# Patient Record
Sex: FEMALE | Race: WHITE | Employment: UNEMPLOYED | URBAN - METROPOLITAN AREA
[De-identification: names, ages, dates, MRNs, and addresses within clinical notes are randomized per-mention and may not be internally consistent; named-entity substitution may affect disease eponyms.]

---

## 2017-01-11 ENCOUNTER — OFFICE VISIT (OUTPATIENT)
Dept: FAMILY MEDICINE CLINIC | Facility: CLINIC | Age: 22
End: 2017-01-11

## 2017-01-11 VITALS
TEMPERATURE: 99 F | BODY MASS INDEX: 23.3 KG/M2 | WEIGHT: 138.19 LBS | DIASTOLIC BLOOD PRESSURE: 62 MMHG | SYSTOLIC BLOOD PRESSURE: 100 MMHG | HEIGHT: 64.5 IN | RESPIRATION RATE: 16 BRPM | HEART RATE: 78 BPM

## 2017-01-11 DIAGNOSIS — Z30.40 ENCOUNTER FOR SURVEILLANCE OF CONTRACEPTIVES: ICD-10-CM

## 2017-01-11 DIAGNOSIS — M54.81 BILATERAL OCCIPITAL NEURALGIA: Primary | ICD-10-CM

## 2017-01-11 DIAGNOSIS — N94.6 DYSMENORRHEA: ICD-10-CM

## 2017-01-11 PROCEDURE — 99213 OFFICE O/P EST LOW 20 MIN: CPT | Performed by: FAMILY MEDICINE

## 2017-01-11 RX ORDER — LEVONORGESTREL AND ETHINYL ESTRADIOL 0.1-0.02MG
1 KIT ORAL DAILY
Qty: 3 PACKAGE | Refills: 3 | Status: SHIPPED | OUTPATIENT
Start: 2017-01-11 | End: 2017-12-10

## 2017-01-11 NOTE — PATIENT INSTRUCTIONS
Kaila perez were seen for a referral to Dr. Matti Torres and also BCP refill. See you in two weeks for CPE and pap.  Take care, Dr. Robert Mancilla

## 2017-01-20 PROBLEM — M54.2 CERVICALGIA: Status: ACTIVE | Noted: 2017-01-20

## 2017-01-27 ENCOUNTER — OFFICE VISIT (OUTPATIENT)
Dept: FAMILY MEDICINE CLINIC | Facility: CLINIC | Age: 22
End: 2017-01-27

## 2017-01-27 VITALS
HEART RATE: 88 BPM | DIASTOLIC BLOOD PRESSURE: 70 MMHG | RESPIRATION RATE: 16 BRPM | TEMPERATURE: 99 F | WEIGHT: 138 LBS | HEIGHT: 64.5 IN | BODY MASS INDEX: 23.27 KG/M2 | SYSTOLIC BLOOD PRESSURE: 118 MMHG

## 2017-01-27 DIAGNOSIS — Z71.82 EXERCISE COUNSELING: ICD-10-CM

## 2017-01-27 DIAGNOSIS — Z12.39 SCREENING BREAST EXAMINATION: ICD-10-CM

## 2017-01-27 DIAGNOSIS — Z53.29 PAP SMEAR OF CERVIX DECLINED BECAUSE OF OTHER REASONS: ICD-10-CM

## 2017-01-27 DIAGNOSIS — Z71.3 DIETARY COUNSELING: ICD-10-CM

## 2017-01-27 DIAGNOSIS — Z11.3 SCREEN FOR STD (SEXUALLY TRANSMITTED DISEASE): ICD-10-CM

## 2017-01-27 DIAGNOSIS — Z00.00 ROUTINE GENERAL MEDICAL EXAMINATION AT A HEALTH CARE FACILITY: Primary | ICD-10-CM

## 2017-01-27 PROBLEM — M54.2 CERVICALGIA: Chronic | Status: ACTIVE | Noted: 2017-01-20

## 2017-01-27 LAB
APPEARANCE: CLEAR
BILIRUBIN: NEGATIVE
KETONES (URINE DIPSTICK): 1.02 MG/DL
LEUKOCYTES: NEGATIVE
MULTISTIX LOT#: NORMAL NUMERIC
NITRITE, URINE: NEGATIVE
OCCULT BLOOD: 5.5
PH, URINE: NEGATIVE (ref 4.5–8)
PROTEIN (URINE DIPSTICK): 0.2 MG/DL
URINE-COLOR: YELLOW
UROBILINOGEN,SEMI-QN: 0.2 MG/DL (ref 0–1.9)

## 2017-01-27 PROCEDURE — 87591 N.GONORRHOEAE DNA AMP PROB: CPT | Performed by: FAMILY MEDICINE

## 2017-01-27 PROCEDURE — 87491 CHLMYD TRACH DNA AMP PROBE: CPT | Performed by: FAMILY MEDICINE

## 2017-01-27 PROCEDURE — G0438 PPPS, INITIAL VISIT: HCPCS | Performed by: FAMILY MEDICINE

## 2017-01-27 PROCEDURE — 99395 PREV VISIT EST AGE 18-39: CPT | Performed by: FAMILY MEDICINE

## 2017-01-27 NOTE — PATIENT INSTRUCTIONS
Dylan Medrano you were seen today for your annual breast exam and physical. Please return for a pap in a few months. Please continue healthy habits with your diet and exercise. Wear sunscreen as needed and insect repellant when needed.   See a dentist and eye d Syphilis Women at increased risk for infection – talk with your healthcare provider At routine exams   Tuberculosis Women at increased risk for infection – talk with your healthcare provider Ask your healthcare provider   Vision All women in this age group Tetanus/diphtheria/pertussis (Td/Tdap) booster All women in this age group Td every 10 years, or a one-time dose of Tdap instead of a Td booster after age 25, then Td every 10 years   Counseling Who needs it How often   BRCA gene mutation testing for breas

## 2017-01-27 NOTE — PROGRESS NOTES
HPI:   Alayna Villalba is a 20year old female who presents for a complete physical exam and screening breast exam.  She is not going to do pap as on menses. Pt feeling well today.  Dentist and eye doctors--seen dentist and due for eye exam;  Diet --desc breast exam; cannot do pap due to menses; See details of HPI.   SKIN: denies any unusual skin lesions  EYES:denies blurred vision or double vision  HEENT: denies nasal congestion, sinus pain or ST  LUNGS: denies shortness of breath with exertion  CARDIOVASC AND PLAN:   Deyanira Christian presents for her annual exam and screening normal breast exam.  Self breast exams and regular skin exams advised. She will return for her pap in a few months when off menses.  She agreed to urine screen for GC/ CHL as she has pa

## 2017-01-29 LAB
C TRACH DNA SPEC QL NAA+PROBE: NEGATIVE
N GONORRHOEA DNA SPEC QL NAA+PROBE: NEGATIVE

## 2017-01-29 NOTE — PROGRESS NOTES
Quick Note:    Please call pt with negative STD screening results for GC/CHL and repeat as directed annually--- Dr. Hwang   ______

## 2017-03-30 ENCOUNTER — OFFICE VISIT (OUTPATIENT)
Dept: FAMILY MEDICINE CLINIC | Facility: CLINIC | Age: 22
End: 2017-03-30

## 2017-03-30 VITALS
SYSTOLIC BLOOD PRESSURE: 114 MMHG | TEMPERATURE: 98 F | BODY MASS INDEX: 23.78 KG/M2 | HEART RATE: 77 BPM | WEIGHT: 141 LBS | RESPIRATION RATE: 16 BRPM | DIASTOLIC BLOOD PRESSURE: 76 MMHG | HEIGHT: 64.5 IN

## 2017-03-30 DIAGNOSIS — Z01.419 NORMAL PELVIC EXAM: ICD-10-CM

## 2017-03-30 DIAGNOSIS — Z01.419 PAP SMEAR, LOW-RISK: Primary | ICD-10-CM

## 2017-03-30 DIAGNOSIS — Z12.4 ROUTINE CERVICAL SMEAR: ICD-10-CM

## 2017-03-30 PROCEDURE — 87624 HPV HI-RISK TYP POOLED RSLT: CPT | Performed by: FAMILY MEDICINE

## 2017-03-30 PROCEDURE — 99214 OFFICE O/P EST MOD 30 MIN: CPT | Performed by: FAMILY MEDICINE

## 2017-03-30 PROCEDURE — 88175 CYTOPATH C/V AUTO FLUID REDO: CPT | Performed by: FAMILY MEDICINE

## 2017-03-30 NOTE — PATIENT INSTRUCTIONS
Paige Urbina you were seen for a pap today as we could not do one in January. Keep up annual paps and healthy habits. Take care, Dr. Lexis Henning    Why Have a Pap Test?  Early on, cervical changes don't cause symptoms.  Often, the only way to know you have · A first Pap test at age 24.  And then every 3 years until age 34, HPV testing is not recommended during this time, though it may be done to follow-up on an abnormal Pap test.  · Starting at age 27, the preferred testing is a Pap test done with an HPV test When your Pap test is sent to the lab, the lab studies your cell samples and reports any abnormal cell changes. Your health care provider can discuss these changes with you. In some cases, an abnormal Pap test is due to an infection.  More serious cell chase · Mild dysplasia: Cells show distinct changes. More testing or HPV typing may be done. You may also have treatment to destroy or remove problem cells. (Reported as low-grade JUSTINE or PADILLA 1.)  · Moderate to severe dysplasia: Cells show precancerous changes.  O

## 2017-03-30 NOTE — PROGRESS NOTES
HPI:   Justin Sung is a 20year old female here for pap only. She had a comprehensive physical on 1/27/17. Monthly menses are fine and normal for her --no mood issues or pain; SA --not currently. PAP--had only one in past was ok; No other issues.  Lef no depression or anxiety     EXAM:   /76 mmHg  Pulse 77  Temp(Src) 98 °F (36.7 °C) (Temporal)  Resp 16  Ht 64.5\"  Wt 141 lb  BMI 23.84 kg/m2  LMP 03/23/2017  Body mass index is 23.84 kg/(m^2).    GENERAL: well developed, well nourished, in no apparen Hpv Dna  High Risk , Thin Prep Collect;  Future  Dr. Rayray Mane

## 2017-03-31 LAB
LAST PAP RESULT: NORMAL
PAP HISTORY (OTHER THAN LAST PAP): NORMAL

## 2017-09-14 ENCOUNTER — TELEPHONE (OUTPATIENT)
Dept: FAMILY MEDICINE CLINIC | Facility: CLINIC | Age: 22
End: 2017-09-14

## 2017-09-14 DIAGNOSIS — M54.81 BILATERAL OCCIPITAL NEURALGIA: Primary | Chronic | ICD-10-CM

## 2017-10-05 ENCOUNTER — OFFICE VISIT (OUTPATIENT)
Dept: FAMILY MEDICINE CLINIC | Facility: CLINIC | Age: 22
End: 2017-10-05

## 2017-10-05 DIAGNOSIS — Z23 NEED FOR VACCINATION: ICD-10-CM

## 2017-10-05 PROCEDURE — 90686 IIV4 VACC NO PRSV 0.5 ML IM: CPT | Performed by: FAMILY MEDICINE

## 2017-10-05 PROCEDURE — 90471 IMMUNIZATION ADMIN: CPT | Performed by: FAMILY MEDICINE

## 2017-10-13 NOTE — PROGRESS NOTES
Pt presents to clinic for Flu vaccine, consent obtained and reviewed.  Flu shot administered without complication

## 2017-12-10 DIAGNOSIS — N94.6 DYSMENORRHEA: ICD-10-CM

## 2017-12-10 DIAGNOSIS — Z30.40 ENCOUNTER FOR SURVEILLANCE OF CONTRACEPTIVES: ICD-10-CM

## 2017-12-11 RX ORDER — LEVONORGESTREL AND ETHINYL ESTRADIOL 0.1-0.02MG
1 KIT ORAL DAILY
Qty: 84 TABLET | Refills: 0 | Status: SHIPPED | OUTPATIENT
Start: 2017-12-11 | End: 2018-02-23

## 2017-12-11 NOTE — TELEPHONE ENCOUNTER
Requested Medications   LUTERA TABLETS 28S  Will file in chart as: LUTERA 0.1-20 MG-MCG Oral Tab  Take 1 tablet by mouth daily.   Original sig: TAKE 1 TABLET BY MOUTH DAILY       Disp: 84 tablet Refills: 0    Class: Normal Start: 12/10/2017   For: [employer]

## 2018-02-23 ENCOUNTER — OFFICE VISIT (OUTPATIENT)
Dept: FAMILY MEDICINE CLINIC | Facility: CLINIC | Age: 23
End: 2018-02-23

## 2018-02-23 VITALS
HEIGHT: 64.5 IN | SYSTOLIC BLOOD PRESSURE: 112 MMHG | HEART RATE: 78 BPM | DIASTOLIC BLOOD PRESSURE: 80 MMHG | RESPIRATION RATE: 18 BRPM | WEIGHT: 152.38 LBS | TEMPERATURE: 98 F | BODY MASS INDEX: 25.7 KG/M2 | OXYGEN SATURATION: 98 %

## 2018-02-23 DIAGNOSIS — Z11.8 SCREENING FOR CHLAMYDIAL DISEASE: Primary | ICD-10-CM

## 2018-02-23 DIAGNOSIS — Z30.41 ENCOUNTER FOR SURVEILLANCE OF CONTRACEPTIVE PILLS: ICD-10-CM

## 2018-02-23 DIAGNOSIS — M54.81 BILATERAL OCCIPITAL NEURALGIA: Chronic | ICD-10-CM

## 2018-02-23 DIAGNOSIS — Z23 NEED FOR VACCINATION: ICD-10-CM

## 2018-02-23 DIAGNOSIS — N94.6 DYSMENORRHEA: ICD-10-CM

## 2018-02-23 DIAGNOSIS — M54.2 CERVICALGIA: Chronic | ICD-10-CM

## 2018-02-23 LAB — CONTROL LINE PRESENT WITH A CLEAR BACKGROUND (YES/NO): YES YES/NO

## 2018-02-23 PROCEDURE — 87591 N.GONORRHOEAE DNA AMP PROB: CPT | Performed by: FAMILY MEDICINE

## 2018-02-23 PROCEDURE — 90471 IMMUNIZATION ADMIN: CPT | Performed by: FAMILY MEDICINE

## 2018-02-23 PROCEDURE — 99213 OFFICE O/P EST LOW 20 MIN: CPT | Performed by: FAMILY MEDICINE

## 2018-02-23 PROCEDURE — 87491 CHLMYD TRACH DNA AMP PROBE: CPT | Performed by: FAMILY MEDICINE

## 2018-02-23 PROCEDURE — 90715 TDAP VACCINE 7 YRS/> IM: CPT | Performed by: FAMILY MEDICINE

## 2018-02-23 PROCEDURE — 81025 URINE PREGNANCY TEST: CPT | Performed by: FAMILY MEDICINE

## 2018-02-23 RX ORDER — LEVONORGESTREL AND ETHINYL ESTRADIOL 0.1-0.02MG
1 KIT ORAL DAILY
Qty: 84 TABLET | Refills: 0 | Status: SHIPPED | OUTPATIENT
Start: 2018-02-23 | End: 2018-05-18

## 2018-02-23 NOTE — PATIENT INSTRUCTIONS
-Condoms are recommended in all  and unmarried couples due to help spread HIV infection and other sexually transmitted infections per CDC guidelines. · Oral birth control pills can cause blood clots in the body.   · Signs of blood clots in the legs · May not work as well when taken with certain other medicines (check with your pharmacist)  · May cause side effects such as nausea, irregular bleeding, headaches, breast tenderness, fatigue, or mood changes (these often go away within 3 months)  · May in If the infection is not treated, it can lead to more serious health problems. In women, this can be pelvic inflammatory disease (PID). PID can make a woman sterile. It can also cause an ectopic (tubal) pregnancy.  This type of pregnancy cannot be carried to

## 2018-02-23 NOTE — PROGRESS NOTES
CHIEF COMPLAINT: Patient presents with:  Establish Care: BC refill; referral neurology      HPI:     Katelynn Asher is a 24year old female presents for establish care birth control refill and referral for neurology. OCP-×3-4 years.   Currently not sex Smokeless tobacco: Never Used                      Alcohol use:  No                 Medications (Active prior to today's visit):    Current Outpatient Prescriptions:  Levonorgestrel-Ethinyl Estrad (LUTERA) 0.1-20 MG-MCG Oral Tab Take 1 tablet by mouth d results within 2 Month(s) from this visit.    Latest known visit with results is:   Office Visit on 03/30/2017   Component Date Value   • Thin Prep Pap 04/04/2017 AP TEST REFLEXED    • Case Report 03/31/2017                      Value:Gynecologic Cytology Value:This result contains rich text formatting which cannot be displayed here.    • HPV High Risk 04/02/2017 Negative    • HPV Source 04/02/2017 Cervical/endocervical/vaginal       REVIEWED THIS VISIT  ASSESSMENT/PLAN:   24year old female with    1 DO      Patient understands plan and follow-up.   Return in about 2 months (around 4/23/2018) for annual physical, fasting labs AM.

## 2018-02-26 LAB
C TRACH DNA SPEC QL NAA+PROBE: NEGATIVE
N GONORRHOEA DNA SPEC QL NAA+PROBE: NEGATIVE

## 2018-02-28 ENCOUNTER — TELEPHONE (OUTPATIENT)
Dept: FAMILY MEDICINE CLINIC | Facility: CLINIC | Age: 23
End: 2018-02-28

## 2018-02-28 NOTE — TELEPHONE ENCOUNTER
LMOM to return call to the office. Provided pt office phone [phone] along with office hours given. Notes Recorded by Orly Galvan DO on 2/26/2018 at 9:24 PM CST  Results reviewed. Tests show no significant abnormalities. Please inform patient.

## 2018-04-25 ENCOUNTER — OFFICE VISIT (OUTPATIENT)
Dept: FAMILY MEDICINE CLINIC | Facility: CLINIC | Age: 23
End: 2018-04-25

## 2018-04-25 VITALS
WEIGHT: 144 LBS | TEMPERATURE: 97 F | RESPIRATION RATE: 18 BRPM | HEART RATE: 89 BPM | DIASTOLIC BLOOD PRESSURE: 80 MMHG | BODY MASS INDEX: 24 KG/M2 | OXYGEN SATURATION: 98 % | SYSTOLIC BLOOD PRESSURE: 100 MMHG

## 2018-04-25 DIAGNOSIS — R12 HEARTBURN: Primary | ICD-10-CM

## 2018-04-25 DIAGNOSIS — K29.00 OTHER ACUTE GASTRITIS WITHOUT HEMORRHAGE: ICD-10-CM

## 2018-04-25 PROCEDURE — 99213 OFFICE O/P EST LOW 20 MIN: CPT | Performed by: FAMILY MEDICINE

## 2018-04-25 RX ORDER — SENNOSIDES 8.6 MG
650 CAPSULE ORAL EVERY 8 HOURS PRN
Qty: 100 TABLET | Refills: 0 | Status: SHIPPED | OUTPATIENT
Start: 2018-04-25

## 2018-04-25 RX ORDER — OMEPRAZOLE 40 MG/1
CAPSULE, DELAYED RELEASE ORAL
Qty: 90 CAPSULE | Refills: 0 | Status: SHIPPED | OUTPATIENT
Start: 2018-04-25 | End: 2018-07-30

## 2018-04-25 NOTE — PATIENT INSTRUCTIONS
Treating Gastritis     Take your medicines as directed, even if your stomach pain goes away. A medical evaluation will be done to find out the cause of your symptoms. The evaluation may include your health history, a physical exam, and some tests.  Onc Understanding Gastritis    Gastritis is a painful inflammation of the stomach lining. It has a number of causes. Gastritis and its symptoms can be relieved with treatment. Work with your healthcare provider to find ways to treat your symptoms.   The Stomach Every medicine has a different purpose. So, each one needs to be taken as prescribed. Don’t skip pills or stop taking a medicine, even when you feel fine. To stay on track try to:  · Take your medicine at set times.  You could take it each morning with jose rafael · Give a copy of your medicine list to a family member or close friend. Hold copies of each other’s lists in case of emergency. · Store medicines in a cool, dry, dark place – not in a steamy bathroom.   · Make sure you tell your healthcare providers if you Date Last Reviewed: 7/1/2016  © 3928-9286 [employer] [address]. [address] [facility] [place], [address] [place]. All rights reserved. This information is not intended as a substitute for professional medical care.  Always follow your healthcare professional'

## 2018-04-25 NOTE — PROGRESS NOTES
CHIEF COMPLAINT: Patient presents with:  Heartburn: since February; afternoon    HPI:     La Gutierrez is a 24year old female presents for evaluation of heartburn that started in February.   Has burning in her stomach at times it is nonradiating and bu (Active prior to today's visit):    Current Outpatient Prescriptions:  Omeprazole 40 MG Oral Capsule Delayed Release Take AC breakfast 45mins Disp: 90 capsule Rfl: 0   Acetaminophen ER (TYLENOL 8 HOUR ARTHRITIS PAIN) 650 MG Oral Tab CR Take 1 tablet (650 m rashes  Neuro: AOx3. Normal gait    LABS     No visits with results within 2 Month(s) from this visit.    Latest known visit with results is:   Office Visit on 02/23/2018   Component Date Value   • Pregnancy Test, Urine 02/23/2018 neg    • Control Line Pre or worsening or changing symptoms. Patient is to call with any side effects or complications from the treatments as a result of today.      Problem List:   Patient Active Problem List:     Bilateral occipital neuralgia     Dysmenorrhea     Cervicalgia

## 2018-04-27 ENCOUNTER — APPOINTMENT (OUTPATIENT)
Dept: LAB | Facility: HOSPITAL | Age: 23
End: 2018-04-27
Attending: FAMILY MEDICINE
Payer: COMMERCIAL

## 2018-04-27 DIAGNOSIS — R12 HEARTBURN: ICD-10-CM

## 2018-04-27 DIAGNOSIS — K29.00 OTHER ACUTE GASTRITIS WITHOUT HEMORRHAGE: ICD-10-CM

## 2018-04-27 PROCEDURE — 87338 HPYLORI STOOL AG IA: CPT

## 2018-05-02 ENCOUNTER — TELEPHONE (OUTPATIENT)
Dept: FAMILY MEDICINE CLINIC | Facility: CLINIC | Age: 23
End: 2018-05-02

## 2018-05-02 NOTE — TELEPHONE ENCOUNTER
SPoke with pt, reviewed results, pt verbalized understanding    Notes recorded by Orly Galvan DO on 4/30/2018 at 8:24 AM CDT  Results reviewed. Tests show no significant abnormalities.  Please inform patient

## 2018-05-18 ENCOUNTER — OFFICE VISIT (OUTPATIENT)
Dept: FAMILY MEDICINE CLINIC | Facility: CLINIC | Age: 23
End: 2018-05-18

## 2018-05-18 VITALS
SYSTOLIC BLOOD PRESSURE: 98 MMHG | BODY MASS INDEX: 24.62 KG/M2 | HEART RATE: 83 BPM | DIASTOLIC BLOOD PRESSURE: 72 MMHG | WEIGHT: 146 LBS | OXYGEN SATURATION: 98 % | RESPIRATION RATE: 18 BRPM | HEIGHT: 64.5 IN | TEMPERATURE: 99 F

## 2018-05-18 DIAGNOSIS — Z13.21 SCREENING FOR ENDOCRINE, NUTRITIONAL, METABOLIC AND IMMUNITY DISORDER: ICD-10-CM

## 2018-05-18 DIAGNOSIS — Z13.0 SCREENING FOR ENDOCRINE, NUTRITIONAL, METABOLIC AND IMMUNITY DISORDER: ICD-10-CM

## 2018-05-18 DIAGNOSIS — N94.6 DYSMENORRHEA: ICD-10-CM

## 2018-05-18 DIAGNOSIS — Z30.41 ENCOUNTER FOR SURVEILLANCE OF CONTRACEPTIVE PILLS: ICD-10-CM

## 2018-05-18 DIAGNOSIS — Z13.0 SCREENING FOR IRON DEFICIENCY ANEMIA: ICD-10-CM

## 2018-05-18 DIAGNOSIS — Z00.00 ANNUAL PHYSICAL EXAM: Primary | ICD-10-CM

## 2018-05-18 DIAGNOSIS — Z13.228 SCREENING FOR ENDOCRINE, NUTRITIONAL, METABOLIC AND IMMUNITY DISORDER: ICD-10-CM

## 2018-05-18 DIAGNOSIS — Z13.29 SCREENING FOR ENDOCRINE, NUTRITIONAL, METABOLIC AND IMMUNITY DISORDER: ICD-10-CM

## 2018-05-18 DIAGNOSIS — Z13.6 SCREENING FOR HEART DISEASE: ICD-10-CM

## 2018-05-18 PROCEDURE — 99395 PREV VISIT EST AGE 18-39: CPT | Performed by: FAMILY MEDICINE

## 2018-05-18 PROCEDURE — 80061 LIPID PANEL: CPT | Performed by: FAMILY MEDICINE

## 2018-05-18 PROCEDURE — 36415 COLL VENOUS BLD VENIPUNCTURE: CPT | Performed by: FAMILY MEDICINE

## 2018-05-18 PROCEDURE — 85025 COMPLETE CBC W/AUTO DIFF WBC: CPT | Performed by: FAMILY MEDICINE

## 2018-05-18 PROCEDURE — 80053 COMPREHEN METABOLIC PANEL: CPT | Performed by: FAMILY MEDICINE

## 2018-05-18 PROCEDURE — 84443 ASSAY THYROID STIM HORMONE: CPT | Performed by: FAMILY MEDICINE

## 2018-05-18 RX ORDER — LEVONORGESTREL AND ETHINYL ESTRADIOL 0.1-0.02MG
1 KIT ORAL DAILY
Qty: 90 TABLET | Refills: 3 | Status: SHIPPED | OUTPATIENT
Start: 2018-05-18 | End: 2019-05-05

## 2018-05-18 NOTE — PATIENT INSTRUCTIONS
Perform labs fasting 8 hours with water or black coffee or or black tea diet  soda only prior to exam.    -Encourage healthy diet of whole food and avoid processed food and sugary drinks and sodas.   Diet should include lean meats and vegetables including 5 Vitamin D comes in several forms. When ultraviolet light, such as sunlight, hits your skin, it creates vitamin D3. D2 is used to fortify dairy foods. Both of these are further processed by your liver and kidneys into a form your body can use.  Most tests fo Children and adults need more than 30 nanograms per milliliter (ng/ml) of vitamin D. The optimal level of 25(OH)D is usually between 30 and 60 ng/mL. Recommended daily amounts range from 400 to 800 international units (IU) per day based on your age.   Level Your body needs calcium to build and repair bones. But it can't make calcium on its own. That's why it's important to eat calcium-rich foods. Some foods are naturally rich in calcium. Others have calcium added (fortified).  It's best to get calcium from the Osteoporosis is a disease that weakens the bones. Weakened bones are more likely to fracture (break). Osteoporosis affects men and women, but postmenopausal women are most at risk.  To help prevent osteoporosis, you need to exercise and nourish your bones t ©2007 [employer] [address] [facility], [address] [place]. All Rights reserved. This information is not intended as a substitute for professional medical care. Always follow your healthcare provider’s instructions.

## 2018-05-18 NOTE — PROGRESS NOTES
Patient came in for fasting labs. Patient first draw attempt at left [facility] unsuccessful. Second attempt at right  [facility]. Lt green, lav tube drawn.

## 2018-05-18 NOTE — PROGRESS NOTES
REASON FOR VISIT:    Garland Katz is a 24year old female who presents for an [address]. Single not active currently.  On ocp dysmenorrhea  G0  menses: regular q month 4-5 days- on menses today-denies dysmenorrhea on OCP  Last pap: 3 No results found for: ALT  No results found for: TSH  No results found for: BUN, [address]     How would you describe your current health state?: Fair    Type of Diet: Balanced    How do you maintain positive mental well-being?: Social I Cholesterol Screening Recommended screening varies with age, risk and gender No results found for: LDL, LDLC    Diabetes Screening  if history of high blood pressure or other  risk factors No results found for: A1C  No results found for: GLUCOSE, GLU Acetaminophen ER (TYLENOL 8 HOUR ARTHRITIS PAIN) 650 MG Oral Tab CR Take 1 tablet (650 mg total) by mouth every 8 (eight) hours as needed for Pain.  Max dose: 6 tablets or less than 4000mg daily Disp: 100 tablet Rfl: 0   Levonorgestrel-Ethinyl Estrad (Elisa Fu ALL/ASTHMA: denies hx of allergy or asthma    EXAM:   BP 98/72 (BP Location: Right arm, Patient Position: Sitting, Cuff Size: adult)   Pulse 83   Temp 99.2 °F (37.3 °C) (Oral)   Resp 18   Ht 64.5\"   Wt 146 lb   LMP 05/16/2018 (Exact Date)   SpO2 98%   BMI -Needs 1000 mg of calcium daily for osteoporosis prevention discussed  -thin prep pap recommended every 3 years due 3/30/2020-annual pelvics recommended  - CBC WITH DIFFERENTIAL WITH PLATELET; Future  - COMP METABOLIC PANEL (14);  Future  - LIPID PANEL; Fut Varicella 2 doses if not immune   MMR 1-2 doses if born after 1957 and not immune

## 2018-05-23 ENCOUNTER — TELEPHONE (OUTPATIENT)
Dept: FAMILY MEDICINE CLINIC | Facility: CLINIC | Age: 23
End: 2018-05-23

## 2018-05-23 NOTE — TELEPHONE ENCOUNTER
LMOM to return call to the office. Provided pt office phone [phone] along with office hours given. Notes recorded by George Summers DO on 5/20/2018 at 10:04 PM CDT  TSH lower end of normal range.  Repeat TSH in 6 mos  CBC and cmp normal except fo

## 2018-05-24 NOTE — TELEPHONE ENCOUNTER
Patient called in reference to her results said she could not see them on my chart and if your could put them on there.

## 2018-07-30 ENCOUNTER — OFFICE VISIT (OUTPATIENT)
Dept: FAMILY MEDICINE CLINIC | Facility: CLINIC | Age: 23
End: 2018-07-30

## 2018-07-30 VITALS
HEART RATE: 83 BPM | OXYGEN SATURATION: 99 % | BODY MASS INDEX: 24.62 KG/M2 | TEMPERATURE: 98 F | DIASTOLIC BLOOD PRESSURE: 68 MMHG | SYSTOLIC BLOOD PRESSURE: 108 MMHG | RESPIRATION RATE: 18 BRPM | WEIGHT: 146 LBS | HEIGHT: 64.75 IN

## 2018-07-30 DIAGNOSIS — K29.00 OTHER ACUTE GASTRITIS WITHOUT HEMORRHAGE: ICD-10-CM

## 2018-07-30 DIAGNOSIS — R19.5 MUCOUS IN STOOLS: ICD-10-CM

## 2018-07-30 DIAGNOSIS — R12 HEARTBURN: Primary | ICD-10-CM

## 2018-07-30 DIAGNOSIS — K92.1 BLOOD PRESENT IN STOOL: ICD-10-CM

## 2018-07-30 PROBLEM — K29.70 GASTRITIS: Status: ACTIVE | Noted: 2018-07-30

## 2018-07-30 LAB
BASOPHILS # BLD AUTO: 0.02 X10(3) UL (ref 0–0.1)
BASOPHILS NFR BLD AUTO: 0.4 %
EOSINOPHIL # BLD AUTO: 0.07 X10(3) UL (ref 0–0.3)
EOSINOPHIL NFR BLD AUTO: 1.4 %
ERYTHROCYTE [DISTWIDTH] IN BLOOD BY AUTOMATED COUNT: 12.1 % (ref 11.5–16)
HCT VFR BLD AUTO: 44 % (ref 34–50)
HGB BLD-MCNC: 14.7 G/DL (ref 12–16)
IMMATURE GRANULOCYTE COUNT: 0.01 X10(3) UL (ref 0–1)
IMMATURE GRANULOCYTE RATIO %: 0.2 %
LYMPHOCYTES # BLD AUTO: 1.62 X10(3) UL (ref 0.9–4)
LYMPHOCYTES NFR BLD AUTO: 33.2 %
MCH RBC QN AUTO: 29.9 PG (ref 27–33.2)
MCHC RBC AUTO-ENTMCNC: 33.4 G/DL (ref 31–37)
MCV RBC AUTO: 89.6 FL (ref 81–100)
MONOCYTES # BLD AUTO: 0.52 X10(3) UL (ref 0.1–1)
MONOCYTES NFR BLD AUTO: 10.7 %
NEUTROPHIL ABS PRELIM: 2.64 X10 (3) UL (ref 1.3–6.7)
NEUTROPHILS # BLD AUTO: 2.64 X10(3) UL (ref 1.3–6.7)
NEUTROPHILS NFR BLD AUTO: 54.1 %
OCCULT BLOOD, STOOL 1: NEGATIVE
PLATELET # BLD AUTO: 219 10(3)UL (ref 150–450)
RBC # BLD AUTO: 4.91 X10(6)UL (ref 3.8–5.1)
RED CELL DISTRIBUTION WIDTH-SD: 39.9 FL (ref 35.1–46.3)
WBC # BLD AUTO: 4.9 X10(3) UL (ref 4–13)

## 2018-07-30 PROCEDURE — 99214 OFFICE O/P EST MOD 30 MIN: CPT | Performed by: FAMILY MEDICINE

## 2018-07-30 PROCEDURE — 85025 COMPLETE CBC W/AUTO DIFF WBC: CPT | Performed by: FAMILY MEDICINE

## 2018-07-30 PROCEDURE — 36415 COLL VENOUS BLD VENIPUNCTURE: CPT | Performed by: FAMILY MEDICINE

## 2018-07-30 RX ORDER — OMEPRAZOLE 40 MG/1
CAPSULE, DELAYED RELEASE ORAL
Qty: 90 CAPSULE | Refills: 0 | Status: SHIPPED | OUTPATIENT
Start: 2018-07-30 | End: 2018-10-23

## 2018-07-30 NOTE — PATIENT INSTRUCTIONS
What Is GERD? With GERD, the weak LES allows food and fluids to travel back, or reflux, into the esophagus. If you often have a painful burning feeling in your chest after you eat, you may have gastroesophageal reflux disease (GERD).  Heartburn t Talk with your provider if you don’t understand how to make the dietary changes needed to control your GERD symptoms. Your provider can refer you to a nutritionist.  Date Last Reviewed: 7/1/2016  © 8740-2176 [employer] [address].  [address] [facility] Call your healthcare provider right away for any of the following:  · Large amount of rectal bleeding   · Increasing abdominal pain  · Weakness, dizziness  Call 911  Call 911 if any of the following occur:  · Loss of consciousness  · Vomiting blood  Date L

## 2018-07-30 NOTE — PROGRESS NOTES
CHIEF COMPLAINT: Patient presents with:  Blood In Stool: twice on thursday    HPI:     Sarwat Martínez is a 24year old female presents for mucus and blood in stools 2 episodes 5 days  ago x1 day after  formed brown stool.   Not associated with straining o Prescriptions:  Omeprazole 40 MG Oral Capsule Delayed Release Take AC breakfast 45mins Disp: 90 capsule Rfl: 0   Levonorgestrel-Ethinyl Estrad (LUTERA) 0.1-20 MG-MCG Oral Tab Take 1 tablet by mouth daily.  Disp: 90 tablet Rfl: 3   Acetaminophen ER (TYLENOL lesions, sores  Extremities: No cyanosis, clubbing, or edema bilaterally. Skin: no acute rashes  Neuro: AOx3. Normal gait    LABS     No visits with results within 2 Month(s) from this visit.    Latest known visit with results is:   Office Visit on 05/18 and pizza, tomato sauce which is acidic foods, laminar line, continue avoid caffeine and observe F heartburn and gastritis symptoms resolved. This will be a trigger. Then start omeprazole. - Omeprazole 40 MG Oral Capsule Delayed Release;  Take AC breakfa

## 2018-08-02 ENCOUNTER — OFFICE VISIT (OUTPATIENT)
Dept: FAMILY MEDICINE CLINIC | Facility: CLINIC | Age: 23
End: 2018-08-02

## 2018-08-02 VITALS
DIASTOLIC BLOOD PRESSURE: 72 MMHG | BODY MASS INDEX: 25 KG/M2 | HEART RATE: 97 BPM | TEMPERATURE: 98 F | SYSTOLIC BLOOD PRESSURE: 116 MMHG | WEIGHT: 146.81 LBS | OXYGEN SATURATION: 99 % | RESPIRATION RATE: 16 BRPM

## 2018-08-02 DIAGNOSIS — R19.5 MUCOUS IN STOOLS: Primary | ICD-10-CM

## 2018-08-02 DIAGNOSIS — K62.5 BRBPR (BRIGHT RED BLOOD PER RECTUM): ICD-10-CM

## 2018-08-02 DIAGNOSIS — R12 HEARTBURN: ICD-10-CM

## 2018-08-02 PROCEDURE — 99214 OFFICE O/P EST MOD 30 MIN: CPT | Performed by: FAMILY MEDICINE

## 2018-08-02 RX ORDER — RANITIDINE 150 MG/1
150 CAPSULE ORAL 2 TIMES DAILY
COMMUNITY
End: 2018-08-02 | Stop reason: ALTCHOICE

## 2018-08-02 NOTE — PROGRESS NOTES
CHIEF COMPLAINT: Patient presents with: Follow - Up: blood in stool, heartburn    HPI:     Zoraida Jules is a 24year old female presents for follow-up of blood and mucus in the stools.   States 2 days ago had 3 or 4 episodes where she is passing mucus Comment: wisdom teeth  08\13\10: OTHER SURGICAL HISTORY      Comment: [place] teeth removal  No date: PATIENT DENIES ANY SURGICAL HISTORY   Family History   Problem Relation Age of Onset   • Arthritis Mother    • Thyroid Disorder Mother    • Breast Cancer P 24.62 kg/m²   Vital signs reviewed. Appears stated age, well groomed. Physical Exam  General: Well-nourished, well hydrated. No acute distress. No pallor. No tachypnea. Non toxic. HEENT: normocephaly, atraumatic. Sclera clear and non icteric bilaterally. 15.1    • HCT 05/18/2018 47.0    • PLT 05/18/2018 208.0    • MCV 05/18/2018 90.9    • MCH 05/18/2018 29.2    • MCHC 05/18/2018 32.1    • RDW 05/18/2018 12.1    • RDW-SD 05/18/2018 40.3    • Neutrophil Absolute Prel* 05/18/2018 3.00    • Neutrophil Absolute breakfast 45mins    stop Zantac  Continue heartburn diet-avoid NSAIDs only use Tylenol/acetaminophen Maxis 4000 mg daily  Given rectal bleeding and mucus will defer stool for H. pylori since most likely will need endoscopy    3. Blood present in stool  4.

## 2018-08-02 NOTE — PATIENT INSTRUCTIONS
Understanding Rectal Bleeding    Rectal bleeding is when blood passes through your rectum and anus. It can happen with or without a bowel movement. Rectal bleeding may be a sign of a serious problem in your rectum, colon, or upper GI tract.  Call your hea © 5891-8585 [employer] [address]. [address] [facility] [place], [facility] [place]. All rights reserved. This information is not intended as a substitute for professional medical care. Always follow your healthcare professional's instructions.         Lower [place] Call your healthcare provider right away for any of the following:  · Large amount of rectal bleeding   · Increasing abdominal pain  · Weakness, dizziness  Call 911  Call 911 if any of the following occur:  · Loss of consciousness  · Vomiting blood  Date L You and your healthcare provider can work together to find the treatment options that best ease your symptoms. These may include lifestyle changes, medicine, and possibly surgery.   Many people find their GERD symptoms decrease when they eat small frequent With gastritis, you may notice one or more of the following:  · A burning feeling in your upper belly  · Pain that happens after eating certain foods  · Gas or a bloated feeling in your stomach  · Frequent belching  · Nausea with or without vomiting  · [place]

## 2018-10-05 ENCOUNTER — OFFICE VISIT (OUTPATIENT)
Dept: FAMILY MEDICINE CLINIC | Facility: CLINIC | Age: 23
End: 2018-10-05

## 2018-10-05 ENCOUNTER — MED REC SCAN ONLY (OUTPATIENT)
Dept: FAMILY MEDICINE CLINIC | Facility: CLINIC | Age: 23
End: 2018-10-05

## 2018-10-05 DIAGNOSIS — Z23 NEED FOR VACCINATION: ICD-10-CM

## 2018-10-05 PROCEDURE — 90686 IIV4 VACC NO PRSV 0.5 ML IM: CPT | Performed by: PHYSICIAN ASSISTANT

## 2018-10-05 PROCEDURE — 90471 IMMUNIZATION ADMIN: CPT | Performed by: PHYSICIAN ASSISTANT

## 2018-10-09 PROCEDURE — 88305 TISSUE EXAM BY PATHOLOGIST: CPT | Performed by: INTERNAL MEDICINE

## 2018-10-22 NOTE — PROGRESS NOTES
Trista Angela is a 24year old female. HPI:   Pt states needs a referral to her neurologist Dr. Danny Han who is managing her bilateral occipital neuralgia. She states she is also due for BCP refill as well which she uses to regular cycles.       Current Ou refill which manages her dysmenorrhea. She will be seen again in two weeks for CPE and pap. She agrees to discussion and plans.      Diagnoses and all orders for this visit:    Bilateral occipital neuralgia  -     Neuro Referral - In Network    Dysmenorrhea 162.56

## 2018-10-23 DIAGNOSIS — R12 HEARTBURN: ICD-10-CM

## 2018-10-23 DIAGNOSIS — K29.00 OTHER ACUTE GASTRITIS WITHOUT HEMORRHAGE: ICD-10-CM

## 2018-10-24 RX ORDER — OMEPRAZOLE 40 MG/1
CAPSULE, DELAYED RELEASE ORAL
Qty: 90 CAPSULE | Refills: 0 | Status: SHIPPED | OUTPATIENT
Start: 2018-10-24 | End: 2019-01-04

## 2019-01-04 ENCOUNTER — HOSPITAL ENCOUNTER (OUTPATIENT)
Dept: GENERAL RADIOLOGY | Age: 24
Discharge: HOME | End: 2019-01-04
Attending: FAMILY MEDICINE
Payer: COMMERCIAL

## 2019-01-04 ENCOUNTER — OFFICE VISIT (OUTPATIENT)
Dept: FAMILY MEDICINE CLINIC | Facility: CLINIC | Age: 24
End: 2019-01-04

## 2019-01-04 VITALS
HEART RATE: 101 BPM | TEMPERATURE: 98 F | BODY MASS INDEX: 27 KG/M2 | DIASTOLIC BLOOD PRESSURE: 68 MMHG | OXYGEN SATURATION: 99 % | WEIGHT: 157.38 LBS | SYSTOLIC BLOOD PRESSURE: 108 MMHG

## 2019-01-04 DIAGNOSIS — K29.00 OTHER ACUTE GASTRITIS WITHOUT HEMORRHAGE: ICD-10-CM

## 2019-01-04 DIAGNOSIS — M54.81 CERVICO-OCCIPITAL NEURALGIA: ICD-10-CM

## 2019-01-04 DIAGNOSIS — M54.81 CERVICO-OCCIPITAL NEURALGIA: Primary | ICD-10-CM

## 2019-01-04 DIAGNOSIS — M54.81 BILATERAL OCCIPITAL NEURALGIA: Chronic | ICD-10-CM

## 2019-01-04 PROCEDURE — 72050 X-RAY EXAM NECK SPINE 4/5VWS: CPT | Performed by: FAMILY MEDICINE

## 2019-01-04 PROCEDURE — 99214 OFFICE O/P EST MOD 30 MIN: CPT | Performed by: FAMILY MEDICINE

## 2019-01-04 NOTE — PATIENT INSTRUCTIONS
GERD (Adult)    The esophagus is a tube that carries food from the mouth to the stomach. A valve (the LES, lower esophageal sphincter) at the lower end of the esophagus prevents stomach acid from flowing upward.  When this valve doesn't work properly, sto · If your symptoms occur during sleep, use a foam wedge to elevate your upper body (not just your head.) Or, place 4\" blocks under the head of your bed. Or use 2 bed risers under your bedframe.   Medicines  If needed, medicines can help relieve the symptom © 4375-2825 [employer] [address]. [address] [facility] [place], [address] [place]. All rights reserved. This information is not intended as a substitute for professional medical care. Always follow your healthcare professional's instructions.         What Is The following foods tend to cause problems for people diagnosed with GERD:  · Tomatoes and tomato products  · Alcohol  · Coffee  · Peppermint  · Greasy or spicy foods  Talk with your provider if you don’t understand how to make the dietary changes needed t

## 2019-01-04 NOTE — PROGRESS NOTES
CHIEF COMPLAINT: Patient presents with: Follow - Up: Acid reflux  Referral: pain management / Neurology    HPI:     Margaret Unger is a 24year old female presents for follow-up occipital neuralgia and gastritis.   Diagnosed 2013 by current neurologist w Dylan Morrison MD at [facility]   • ORAL SURGERY PROCEDURE  8/2011    wisdom teeth   • OTHER SURGICAL HISTORY  08\13\10    [place] teeth removal   • PATIENT DENIES ANY SURGICAL HISTORY        Family History   Problem Relation Age of Onset Vital signs reviewed. Appears stated age, well groomed. Physical Exam  General: Well-nourished, well hydrated. No acute distress. No pallor. No tachypnea  HEENT: normocephaly, atraumatic. PERRL bilaterally. Sclera clear and non icteric bilaterally.  Moist formatting which cannot be displayed here. • Gross Description 10/09/2018                      Value: This result contains rich text formatting which cannot be displayed here.    • Interpretation 10/09/2018 Benign       REVIEWED THIS VISIT  ASSESSMENT/PLAN Dysmenorrhea     Cervicalgia     Encounter for surveillance of contraceptive pills     Heartburn     Mucous in stools     Gastritis      Imaging & Referrals:  None     1/4/2019  Sarabjit Lozano, DO      Patient understands plan and follow-up.   Return in ab

## 2019-01-09 ENCOUNTER — TELEPHONE (OUTPATIENT)
Dept: FAMILY MEDICINE CLINIC | Facility: CLINIC | Age: 24
End: 2019-01-09

## 2019-01-09 NOTE — TELEPHONE ENCOUNTER
LMOM to return call to the office. Provided pt office phone [phone] along with office hours given.   Left detailed message insurance approved PT for 8 visits

## 2019-02-14 ENCOUNTER — OFFICE VISIT (OUTPATIENT)
Dept: NEUROLOGY | Facility: CLINIC | Age: 24
End: 2019-02-14

## 2019-02-14 VITALS
SYSTOLIC BLOOD PRESSURE: 110 MMHG | RESPIRATION RATE: 15 BRPM | DIASTOLIC BLOOD PRESSURE: 72 MMHG | HEART RATE: 82 BPM | BODY MASS INDEX: 26.14 KG/M2 | HEIGHT: 64.5 IN | WEIGHT: 155 LBS

## 2019-02-14 DIAGNOSIS — M54.81 BILATERAL OCCIPITAL NEURALGIA: Primary | ICD-10-CM

## 2019-02-14 DIAGNOSIS — G44.86 CERVICOGENIC HEADACHE: ICD-10-CM

## 2019-02-14 PROCEDURE — 99244 OFF/OP CNSLTJ NEW/EST MOD 40: CPT | Performed by: OTHER

## 2019-02-14 RX ORDER — NORTRIPTYLINE HYDROCHLORIDE 10 MG/1
CAPSULE ORAL
Qty: 60 CAPSULE | Refills: 1 | Status: SHIPPED | OUTPATIENT
Start: 2019-02-14 | End: 2019-04-17

## 2019-02-15 ENCOUNTER — TELEPHONE (OUTPATIENT)
Dept: NEUROLOGY | Facility: CLINIC | Age: 24
End: 2019-02-15

## 2019-02-15 NOTE — TELEPHONE ENCOUNTER
Tizanidine should be cut in half, and gabapentin kept the same for now. If pain worsens with cutting tizanidine in half (2mg), it should be increased again to 4mg.  If she tolerates all 3 medications, will continue all, otherwise will need to cut evening ga

## 2019-02-15 NOTE — TELEPHONE ENCOUNTER
Pt saw Dr. Liset Pritchett in office yesterday. Is wanting to confirm she is to continue taking both Gabapentin she was prescribed by previous neurologist and the new medication, Nortriptyline. Will get back to pt, note from visit not yet completed.  Pt verbalized

## 2019-03-04 ENCOUNTER — OFFICE VISIT (OUTPATIENT)
Dept: FAMILY MEDICINE CLINIC | Facility: CLINIC | Age: 24
End: 2019-03-04

## 2019-03-04 VITALS
SYSTOLIC BLOOD PRESSURE: 100 MMHG | TEMPERATURE: 98 F | RESPIRATION RATE: 18 BRPM | DIASTOLIC BLOOD PRESSURE: 64 MMHG | HEART RATE: 88 BPM | BODY MASS INDEX: 26 KG/M2 | WEIGHT: 152.19 LBS

## 2019-03-04 DIAGNOSIS — M54.81 BILATERAL OCCIPITAL NEURALGIA: Primary | Chronic | ICD-10-CM

## 2019-03-04 DIAGNOSIS — R12 HEARTBURN: ICD-10-CM

## 2019-03-04 PROCEDURE — 99213 OFFICE O/P EST LOW 20 MIN: CPT | Performed by: FAMILY MEDICINE

## 2019-03-04 RX ORDER — OMEPRAZOLE 20 MG/1
CAPSULE, DELAYED RELEASE ORAL
Qty: 30 CAPSULE | Refills: 6 | Status: SHIPPED | OUTPATIENT
Start: 2019-03-04 | End: 2019-05-23 | Stop reason: ALTCHOICE

## 2019-03-04 NOTE — PATIENT INSTRUCTIONS
As of October 6th 2014, the Drug Enforcement Agency [facility]) is reclassifying all hydrocodone combination medications from Schedule III to Schedule II. This includes medications such as Norco, Vicodin, Lortab, Zohydro, and Vicoprofen.      What this means for reflux, into the esophagus. If you often have a painful burning feeling in your chest after you eat, you may have gastroesophageal reflux disease (GERD). Heartburn that keeps coming back is a classic symptom of GERD.  But you may have other symptoms as Reviewed: 7/1/2016  © 8680-1171 [employer] [address]. [address] [facility] [place], [address] [place]. All rights reserved. This information is not intended as a substitute for professional medical care.  Always follow your healthcare professional's instruct alcohol or smoke. As much as possible, stay away from second hand smoke. · If you are overweight, losing weight will reduce symptoms.   · Don't wear tight clothing around your stomach area.   · If your symptoms occur during sleep, use a foam wedge to [address]va [address]. [address] [facility], [address] [place] [address]. All rights reserved. This information is not intended as a substitute for professional medical care. Always follow your healthcare professional's instructions.         Discharge Instructions you have any of the following:  · Trouble swallowing  · Pain when swallowing  · Feeling of food caught in your chest or throat  · Pain in the neck, chest, or back  · Heartburn that causes you to vomit  · Vomiting blood  · Black or tarry stools (from digest

## 2019-03-04 NOTE — PROGRESS NOTES
CHIEF COMPLAINT: Patient presents with: Follow - Up: Saw Neurology, neck doing better  Gastro-esophageal Reflux: still with symptoms on and off; using OTC Zantac which helps.      HPI:     Katelynn Davispipers is a 21year old female presents for discuss jaki [place] teeth removal   • PATIENT DENIES ANY SURGICAL HISTORY        Family History   Problem Relation Age of Onset   • Arthritis Mother    • Thyroid Disorder Mother    • Breast Cancer Paternal Grandmother    • High Blood Pressure Maternal Grandfather    • distress. No pallor. No tachypnea. Non toxic. HEENT: normocephaly, atraumatic. Sclera clear and non icteric bilaterally. Oral pharynx clear without normal finding. Moist mucous membranes. Neck: supple. No adenopathy. No thyromegaly.    Heart: RRR witho which cannot be displayed here.    • Interpretation 10/09/2018 Benign       REVIEWED THIS VISIT  ASSESSMENT/PLAN:   21year old female with    1. Bilateral occipital neuralgia   Continue nortriptyline 10 mg then increase as directed by neurologist.  Patient learning. Medical education done. Outcome: Patient verbalizes understanding. Patient is notified to call with any questions, comp lications, allergies, or worsening or changing symptoms.   Patient is to call with any side effects or complications from the

## 2019-03-19 ENCOUNTER — OFFICE VISIT (OUTPATIENT)
Dept: NEUROLOGY | Facility: CLINIC | Age: 24
End: 2019-03-19

## 2019-03-19 VITALS
WEIGHT: 150 LBS | BODY MASS INDEX: 25 KG/M2 | HEART RATE: 80 BPM | RESPIRATION RATE: 18 BRPM | SYSTOLIC BLOOD PRESSURE: 110 MMHG | DIASTOLIC BLOOD PRESSURE: 70 MMHG

## 2019-03-19 DIAGNOSIS — M54.81 BILATERAL OCCIPITAL NEURALGIA: Primary | ICD-10-CM

## 2019-03-19 DIAGNOSIS — G44.86 CERVICOGENIC HEADACHE: ICD-10-CM

## 2019-03-19 PROCEDURE — 99213 OFFICE O/P EST LOW 20 MIN: CPT | Performed by: OTHER

## 2019-03-19 NOTE — PROGRESS NOTES
[facility]   Neurology- f/u    Shannan Tucker Patient Status:  No patient class for patient encounter    1995 MRN [id number]   Location [address] [place] [address], [address] [place] [address] Drive, [address] [place] [address] PCP Marissa Peter DO Surgical History:  Past Surgical History:   Procedure Laterality Date   • COLONOSCOPY  10/2018    MAC: internal hemorrhoids   • EGD  10/2018    MAC: gastritis, bx neg for HP   • ESOPHAGOGASTRODUODENOSCOPY, COLONOSCOPY, POSSIBLE BIOPSY, POSSIBLE POLYPECTOMY completed.     Pertinent positives and negatives noted in the HPI.          PHYSICAL EXAM:   Neurologic Exam  Vitals   03/19/19  0845   BP: 110/70   Pulse: 80   Resp: 18     General Appearance: Patient is a 21year old female in no acute distress  Cardiac: TID  Continue tizanidine 2mg   Recommended to start PT for cervical region      Requested Prescriptions      No prescriptions requested or ordered in this encounter          We discussed in depth regarding the diagnosis, prognosis, treatment.  The patient w

## 2019-03-25 ENCOUNTER — HOSPITAL ENCOUNTER (OUTPATIENT)
Dept: PHYSICAL THERAPY | Facility: HOSPITAL | Age: 24
Setting detail: THERAPIES SERIES
Discharge: HOME | End: 2019-03-25
Attending: FAMILY MEDICINE
Payer: COMMERCIAL

## 2019-03-25 DIAGNOSIS — M54.81 CERVICO-OCCIPITAL NEURALGIA: ICD-10-CM

## 2019-03-25 PROCEDURE — 97161 PT EVAL LOW COMPLEX 20 MIN: CPT

## 2019-03-25 PROCEDURE — 97140 MANUAL THERAPY 1/> REGIONS: CPT

## 2019-03-25 PROCEDURE — 97110 THERAPEUTIC EXERCISES: CPT

## 2019-03-25 NOTE — PROGRESS NOTES
SPINE EVALUATION:   Referring Physician: Dr. Edita Verma  Diagnosis: cervico-occipital neuralgia     Date of Service: 3/25/2019     PATIENT Carlos Bianchi is a 21year old female who presents to therapy today with complaints of neck and occipit Therapy to address the above impairments to decrease pain and return to prior level of function.     Precautions:  None  OBJECTIVE:   Observation/Posture: FHP, mildly rounded shoulders  Neuro Screen: DTR's normal, myotomes normal, sensation not tested    ce Potential:good    FOTO: 51/100      Patient/Family/Caregiver was advised of these findings, precautions, and treatment options and has agreed to actively participate in planning and for this course of care.     Thank you for your referral. Please co-sign or

## 2019-04-01 ENCOUNTER — HOSPITAL ENCOUNTER (OUTPATIENT)
Dept: PHYSICAL THERAPY | Facility: HOSPITAL | Age: 24
Setting detail: THERAPIES SERIES
Discharge: HOME | End: 2019-04-01
Attending: FAMILY MEDICINE
Payer: COMMERCIAL

## 2019-04-01 PROCEDURE — 97110 THERAPEUTIC EXERCISES: CPT

## 2019-04-01 PROCEDURE — 97140 MANUAL THERAPY 1/> REGIONS: CPT

## 2019-04-01 NOTE — PROGRESS NOTES
Dx: cervico-occipital neuralgia          Authorized # of Visits:  8         Next MD visit: none scheduled  Fall Risk: standard         Precautions: n/a             Subjective: Patient reports that her symptoms are about the same.  Still pain sitting in clas

## 2019-04-05 ENCOUNTER — HOSPITAL ENCOUNTER (OUTPATIENT)
Dept: PHYSICAL THERAPY | Facility: HOSPITAL | Age: 24
Setting detail: THERAPIES SERIES
Discharge: HOME | End: 2019-04-05
Attending: FAMILY MEDICINE
Payer: COMMERCIAL

## 2019-04-05 PROCEDURE — 97110 THERAPEUTIC EXERCISES: CPT

## 2019-04-05 PROCEDURE — 97140 MANUAL THERAPY 1/> REGIONS: CPT

## 2019-04-05 NOTE — PROGRESS NOTES
Dx: cervico-occipital neuralgia          Authorized # of Visits:  8         Next MD visit: none scheduled  Fall Risk: standard         Precautions: n/a             Subjective: Patient reports that her symptoms are about the same.  She had pain in class yest red TB, 2x12 Wall angels, 20        TB row, red, 2x15 Dual cable row, 15#, 2x15                            Charges: Jake 2( 30 min) manual x 1 ( 10 min)   Total Timed Treatment: 40 min     Total Treatment Time: 40 min

## 2019-04-08 ENCOUNTER — HOSPITAL ENCOUNTER (OUTPATIENT)
Dept: PHYSICAL THERAPY | Facility: HOSPITAL | Age: 24
Setting detail: THERAPIES SERIES
Discharge: HOME | End: 2019-04-08
Attending: FAMILY MEDICINE
Payer: COMMERCIAL

## 2019-04-08 PROCEDURE — 97140 MANUAL THERAPY 1/> REGIONS: CPT

## 2019-04-08 PROCEDURE — 97110 THERAPEUTIC EXERCISES: CPT

## 2019-04-08 NOTE — PROGRESS NOTES
Dx: cervico-occipital neuralgia          Authorized # of Visits:  8         Next MD visit: none scheduled  Fall Risk: standard         Precautions: n/a             Subjective: Patient reports that she has not had pain since previous session but that she al red TB, 2x12 Supine shoulder flexion in 90/90 position, 2x10       S/l mid trap, 3#, 10 Red TB diagonals, 2x10        TB w's, red TB, 2x12 Wall angels, 20 Red TB diagonals, 10  horiz abduction, 15       TB row, red, 2x15 Dual cable row, 15#, 2x15 Dual cabl

## 2019-04-12 ENCOUNTER — HOSPITAL ENCOUNTER (OUTPATIENT)
Dept: PHYSICAL THERAPY | Facility: HOSPITAL | Age: 24
Setting detail: THERAPIES SERIES
Discharge: HOME | End: 2019-04-12
Attending: FAMILY MEDICINE
Payer: COMMERCIAL

## 2019-04-12 PROCEDURE — 97110 THERAPEUTIC EXERCISES: CPT

## 2019-04-12 PROCEDURE — 97140 MANUAL THERAPY 1/> REGIONS: CPT

## 2019-04-12 NOTE — PROGRESS NOTES
Dx: cervico-occipital neuralgia          Authorized # of Visits:  8         Next MD visit: none scheduled  Fall Risk: standard         Precautions: n/a             Subjective: Patient reports that she had a little more pain in class yesterday, which may be flexion in 90/90 position, 2x10 S/l open book stretch, 15      S/l mid trap, 3#, 10 Red TB diagonals, 2x10  Red TB scapular squeezes, 3 way, 15 each      TB w's, red TB, 2x12 Wall angels, 20 Red TB diagonals, 10  horiz abduction, 15 Bar thoracic roll to ex

## 2019-04-15 ENCOUNTER — APPOINTMENT (OUTPATIENT)
Dept: PHYSICAL THERAPY | Facility: HOSPITAL | Age: 24
End: 2019-04-15
Attending: FAMILY MEDICINE
Payer: COMMERCIAL

## 2019-04-17 DIAGNOSIS — M54.81 CERVICO-OCCIPITAL NEURALGIA: Primary | ICD-10-CM

## 2019-04-17 RX ORDER — NORTRIPTYLINE HYDROCHLORIDE 10 MG/1
CAPSULE ORAL
Qty: 60 CAPSULE | Refills: 1 | Status: SHIPPED | OUTPATIENT
Start: 2019-04-17 | End: 2019-07-02 | Stop reason: ALTCHOICE

## 2019-04-19 ENCOUNTER — HOSPITAL ENCOUNTER (OUTPATIENT)
Dept: PHYSICAL THERAPY | Facility: HOSPITAL | Age: 24
Setting detail: THERAPIES SERIES
Discharge: HOME | End: 2019-04-19
Attending: FAMILY MEDICINE
Payer: COMMERCIAL

## 2019-04-19 PROCEDURE — 97110 THERAPEUTIC EXERCISES: CPT

## 2019-04-19 PROCEDURE — 97140 MANUAL THERAPY 1/> REGIONS: CPT

## 2019-04-19 NOTE — PROGRESS NOTES
Dx: cervico-occipital neuralgia          Authorized # of Visits:  8         Next MD visit: none scheduled  Fall Risk: standard         Precautions: n/a             Subjective: Patient reports that PT is helping.  She is not having as much pain but she does Table serratus punch, 2x15 Supine DNF, [id number] Seated passive thoracic flexion and overpressure Prone thoracic STM, 10 min     Prone mid trap, 1#, 2x10 TB w's, red TB, 2x12 Supine shoulder flexion in 90/90 position, 2x10 S/l open book stretch, 15 Prone mi

## 2019-04-22 ENCOUNTER — PATIENT MESSAGE (OUTPATIENT)
Dept: FAMILY MEDICINE CLINIC | Facility: CLINIC | Age: 24
End: 2019-04-22

## 2019-04-22 ENCOUNTER — HOSPITAL ENCOUNTER (OUTPATIENT)
Dept: PHYSICAL THERAPY | Facility: HOSPITAL | Age: 24
Setting detail: THERAPIES SERIES
Discharge: HOME | End: 2019-04-22
Attending: FAMILY MEDICINE
Payer: COMMERCIAL

## 2019-04-22 DIAGNOSIS — M54.81 CERVICO-OCCIPITAL NEURALGIA: Primary | ICD-10-CM

## 2019-04-22 PROCEDURE — 97110 THERAPEUTIC EXERCISES: CPT

## 2019-04-22 NOTE — TELEPHONE ENCOUNTER
From: Debbie Hawkins  To: George Summers DO  Sent: 4/22/2019 3:15 PM CDT  Subject: Other    Dr. Vincenzo Park it's [facility], and I just wanted to give you a message.  I've been going to Physical Therapy for about a month now and it has been amy

## 2019-04-22 NOTE — PROGRESS NOTES
Dx: cervico-occipital neuralgia          Authorized # of Visits:  8         Next MD visit: none scheduled  Fall Risk: standard         Precautions: n/a             Subjective: Patient reports that she had mild neck pain over the weekend but that she was ab Seated passive thoracic flexion and overpressure Prone thoracic STM, 10 min Supine serratus punch, 10#, 2x10    Prone mid trap, 1#, 2x10 TB w's, red TB, 2x12 Supine shoulder flexion in 90/90 position, 2x10 S/l open book stretch, 15 Prone mid trap, 2#, 2x10

## 2019-04-22 NOTE — TELEPHONE ENCOUNTER
Please see pts mychart message, requesting additional visits of PT. Last PT visit scheduled 5/1/19. Order pended if agreeable.  Thank you    Future Appointments   Date Time Provider Camilo Lloyd   5/1/2019  2:15 PM Tracy Calero, PT [address] [facility] [place]

## 2019-04-23 NOTE — PROGRESS NOTES
Patient informed via [employer] message that PT referral is signed and to continue attending appts as well as HEP. Patient also informed Dr. Vira Lesch is pleased with improvement.

## 2019-04-23 NOTE — TELEPHONE ENCOUNTER
Signed referral for PT  Happy to hear pt pain is finally improving  Continue home exercise program and attending PT  Keep upcoming appt with me 5/23/2019  Please inform. Thank you.

## 2019-05-01 ENCOUNTER — HOSPITAL ENCOUNTER (OUTPATIENT)
Dept: PHYSICAL THERAPY | Facility: HOSPITAL | Age: 24
Setting detail: THERAPIES SERIES
Discharge: HOME | End: 2019-05-01
Attending: FAMILY MEDICINE
Payer: COMMERCIAL

## 2019-05-01 PROCEDURE — 97140 MANUAL THERAPY 1/> REGIONS: CPT

## 2019-05-01 PROCEDURE — 97110 THERAPEUTIC EXERCISES: CPT

## 2019-05-01 NOTE — PROGRESS NOTES
SPINE PROGRESS:   Referring Physician: Dr. Morena Crowe  Diagnosis: cervico-occipital neuralgia     Date of Service: 5/1/2019     PATIENT SUMMARY   Aura Richmond is a 21year old female who has attended 8 session of PT for primary complaint of neck and o min     Total Treatment Time: 45 min     PLAN OF CARE:    Goals:    Patient will demonstrate 80 deg active cervical rotation in order to drive safely within 4 weeks. (progressing)  Patient will tolerate sitting at her computer for 2 hours with no increase

## 2019-05-05 DIAGNOSIS — Z30.41 ENCOUNTER FOR SURVEILLANCE OF CONTRACEPTIVE PILLS: ICD-10-CM

## 2019-05-05 DIAGNOSIS — N94.6 DYSMENORRHEA: ICD-10-CM

## 2019-05-06 RX ORDER — LEVONORGESTREL AND ETHINYL ESTRADIOL 0.1-0.02MG
KIT ORAL
Qty: 84 TABLET | Refills: 0 | Status: SHIPPED | OUTPATIENT
Start: 2019-05-06 | End: 2019-08-04

## 2019-05-06 NOTE — TELEPHONE ENCOUNTER
Pt requesting refill of birth control, passed protocol , refill approved, sent to pharmacy:     Last Time Medication was Filled:  5/18/19 #90 with 3 refills    Last Office Visit with PCP: 3/4/2019      Future Appointments   Date Time Provider Department

## 2019-05-22 ENCOUNTER — HOSPITAL ENCOUNTER (OUTPATIENT)
Dept: PHYSICAL THERAPY | Facility: HOSPITAL | Age: 24
Setting detail: THERAPIES SERIES
Discharge: HOME | End: 2019-05-22
Attending: FAMILY MEDICINE
Payer: COMMERCIAL

## 2019-05-22 PROCEDURE — 97110 THERAPEUTIC EXERCISES: CPT

## 2019-05-22 PROCEDURE — 97140 MANUAL THERAPY 1/> REGIONS: CPT

## 2019-05-22 NOTE — PROGRESS NOTES
Dx: cervico-occipital neuralgia          Authorized # of Visits:  16 through 7/3        Next MD visit: none scheduled  Fall Risk: standard         Precautions: n/a             Subjective: Patient states that she had slightly increased neck soreness and everardo

## 2019-05-23 ENCOUNTER — OFFICE VISIT (OUTPATIENT)
Dept: FAMILY MEDICINE CLINIC | Facility: CLINIC | Age: 24
End: 2019-05-23

## 2019-05-23 VITALS
HEIGHT: 64.7 IN | SYSTOLIC BLOOD PRESSURE: 98 MMHG | OXYGEN SATURATION: 98 % | RESPIRATION RATE: 16 BRPM | WEIGHT: 148 LBS | BODY MASS INDEX: 24.96 KG/M2 | DIASTOLIC BLOOD PRESSURE: 70 MMHG | TEMPERATURE: 99 F | HEART RATE: 83 BPM

## 2019-05-23 DIAGNOSIS — N94.6 DYSMENORRHEA: ICD-10-CM

## 2019-05-23 DIAGNOSIS — Z00.00 ANNUAL PHYSICAL EXAM: Primary | ICD-10-CM

## 2019-05-23 DIAGNOSIS — Z23 NEED FOR VACCINATION AGAINST HEPATITIS A: ICD-10-CM

## 2019-05-23 DIAGNOSIS — Z30.41 ENCOUNTER FOR SURVEILLANCE OF CONTRACEPTIVE PILLS: ICD-10-CM

## 2019-05-23 DIAGNOSIS — Z13.29 SCREENING FOR ENDOCRINE, NUTRITIONAL, METABOLIC AND IMMUNITY DISORDER: ICD-10-CM

## 2019-05-23 DIAGNOSIS — Z13.21 SCREENING FOR ENDOCRINE, NUTRITIONAL, METABOLIC AND IMMUNITY DISORDER: ICD-10-CM

## 2019-05-23 DIAGNOSIS — R74.8 LOW SERUM HDL: ICD-10-CM

## 2019-05-23 DIAGNOSIS — Z13.228 SCREENING FOR ENDOCRINE, NUTRITIONAL, METABOLIC AND IMMUNITY DISORDER: ICD-10-CM

## 2019-05-23 DIAGNOSIS — Z13.0 SCREENING FOR ENDOCRINE, NUTRITIONAL, METABOLIC AND IMMUNITY DISORDER: ICD-10-CM

## 2019-05-23 PROCEDURE — 80053 COMPREHEN METABOLIC PANEL: CPT | Performed by: FAMILY MEDICINE

## 2019-05-23 PROCEDURE — 84443 ASSAY THYROID STIM HORMONE: CPT | Performed by: FAMILY MEDICINE

## 2019-05-23 PROCEDURE — 85025 COMPLETE CBC W/AUTO DIFF WBC: CPT | Performed by: FAMILY MEDICINE

## 2019-05-23 PROCEDURE — 80061 LIPID PANEL: CPT | Performed by: FAMILY MEDICINE

## 2019-05-23 PROCEDURE — 90471 IMMUNIZATION ADMIN: CPT | Performed by: FAMILY MEDICINE

## 2019-05-23 PROCEDURE — 90632 HEPA VACCINE ADULT IM: CPT | Performed by: FAMILY MEDICINE

## 2019-05-23 PROCEDURE — 99395 PREV VISIT EST AGE 18-39: CPT | Performed by: FAMILY MEDICINE

## 2019-05-23 PROCEDURE — G0438 PPPS, INITIAL VISIT: HCPCS | Performed by: FAMILY MEDICINE

## 2019-05-23 NOTE — PROGRESS NOTES
REASON FOR VISIT:    Ryan Rea is a 21year old female who presents for an [address]. No complaints    Single not active currently.  On ocp dysmenorrhea- wants to continue  G0  menses: regular q month 4-5 days- on menses today-luana Acetaminophen ER (TYLENOL 8 HOUR ARTHRITIS PAIN) 650 MG Oral Tab CR Take 1 tablet (650 mg total) by mouth every 8 (eight) hours as needed for Pain.  Max dose: 6 tablets or less than 4000mg daily Disp: 100 tablet Rfl: 0   Nortriptyline HCl 25 MG Oral Cap Juan C INDICATIONS AND SCHEDULE Recommendation Internal Lab or Procedure External Lab or Procedure   Breast Cancer Screening   Every 2 yrs age 54-69 There are no preventive care reminders to display for this patient.     Pap Every 3 yrs age 21-68 or Pap and HPV ev No flowsheet data found. Creatinine  Annually Creatinine (mg/dL)   Date Value   05/18/2018 0.78    No flowsheet data found. Digoxin Serum Conc  Annually No results found for: DIGOXIN No flowsheet data found.     Diabetes      HgbA1C  Annually No resu • ESOPHAGOGASTRODUODENOSCOPY, COLONOSCOPY, POSSIBLE BIOPSY, POSSIBLE POLYPECTOMY 78140, 86318 N/A 10/9/2018    Performed by Prateek Levi MD at [address] [place] [address]  8/2011    wisdom teeth   • OTHER SURGICAL HISTORY  08 EYES:PERRLA, EOMI, normal optic disk, conjunctiva are clear  NECK: supple, no adenopathy, no bruits  CHEST: no chest tenderness  BREAST: no dominant or suspicious mass  LUNGS: clear to auscultation  CARDIO: RRR without murmur  GI: good BS's, no masses, HSM Signs of blood clot reviewed and patient understands if occurs needs to seek immediate medical assistance, call office or if chest pain or shortness of breath or severe symptoms then ER. For leg swelling, leg pain may be seen in the office emergently.   -

## 2019-05-23 NOTE — PROGRESS NOTES
Patient came in for draw of ordered fasting labs. Patient drawn out of  Left AC, x 1 attempt and tolerated well. 1 Light Green and 1 Lavender tube drawn.

## 2019-05-23 NOTE — PATIENT INSTRUCTIONS
Perform labs fasting 8 hours with water or black coffee or or black tea diet  soda only prior to exam.    -Encourage healthy diet of whole food and avoid processed food and sugary drinks and sodas.   Diet should include lean meats and vegetables including 5 Prevention Guidelines, Women Ages 25 to 44  Screening tests and vaccines are an important part of managing your health. A screening test is done to find possible disorders or diseases in people who don't have any symptoms.  The goal is to find a disease ear Type 2 diabetes, prediabetes All women diagnosed with gestational diabetes Lifelong testing every 3 years   Type 2 diabetes All women with prediabetes Every year   Gonorrhea Sexually active women at increased risk for infection At routine exams   Hepatitis Measles, mumps, rubella (MMR) All women in this age group who have no record of these infections or vaccines 1 or 2 doses   Meningococcal Women at increased risk for infection should talk with their healthcare provider 1 or more doses   Pneumococcal conjug © 1990-6840 [employer]. 1407 [facility] [place] [facility], [facility] [place]. All rights reserved. This information is not intended as a substitute for professional medical care. Always follow your healthcare professional's instructions.       Vitamin D What other tests might I have along with this test?  A healthcare provider may also want to check your parathyroid hormone levels and your calcium levels. What do my test results mean?   Test results may vary depending on your age, gender, health history, Tell your healthcare provider if you take vitamin D supplements. Be sure your healthcare provider knows about all medicines, herbs, vitamins, and supplements you are taking.  This includes medicines that don't need a prescription and any illicit drugs you m · Reduce sodium (salt) intake. Eating too much salt may increase your blood pressure. Limit your sodium intake to 2,300 milligrams (mg) per day (the amount in 1 teaspoon of salt), or less if your healthcare provider recommends it.  Dining out less often and Healthy eating starts at the grocery store. Be sure to pay attention to food labels on packaged foods. Look for products that are high in fiber and protein, and low in saturated fat, cholesterol, and sodium. Avoid products that contain trans fat.  And pay c You need to continue exercising to keep these benefits. Your main goal is to make fitness a lifetime commitment. Build a fitness plan that you can stick with. Choose activities you like. Go slowly, especially when just starting out.  Work up to being active © 9170-5693 [employer] [address]. 1407 [facility], [address] [place]. All rights reserved. This information is not intended as a substitute for professional medical care. Always follow your healthcare professional's instructions.         What Is In later years, both men and women need to take extra care of their bones. By this point, the body loses more bone than it makes. If too much bone is lost, you may be at increased risk for fractures. With age, the quality and quantity of bone declines.  You · Caffeine. This increases calcium loss. People who drink a lot of coffee, tea, or soda lose more calcium than those who don't. Date Last Reviewed: 5/1/2018  © 3740-5544 [employer] [address]. 1407 [facility] [place], [address] [place] [address].  All rights rese Your body needs calcium to build and repair bones. But it can't make calcium on its own. That's why it's important to eat calcium-rich foods. Some foods are naturally rich in calcium. Others have calcium added (fortified).  It's best to get calcium from the If you have osteoporosis, exercise is vital for your health. It can prevent bone fractures and spine changes. It will slow bone loss. Exercise will strengthen your body. It can also be fun.  A variety of exercises is best. See below for exercises that can h Vitamin D comes in several forms. When ultraviolet light, such as sunlight, hits your skin, it creates vitamin D3. D2 is used to fortify dairy foods. Both of these are further processed by your liver and kidneys into a form your body can use.  Most tests fo · Not making enough vitamin D on your own  · Not getting enough vitamin D in your diet  · Not absorbing vitamin D from your food as you should  Lower levels may also mean that your body is not converting the vitamin as it should.  This might be because of k Vitamin D is mainly found in fortified dairy foods, juice, breakfast cereal, and certain fish. This vitamin plays many roles in the body. But because it helps the body absorb calcium from foods and supplements, it's particularly important for bone health. Test results may vary depending on your age, gender, health history, the method used for the test, and other things. Your test results may not mean you have a problem. Ask your healthcare provider what your test results mean for you.    Children and adults © 0162-8207 [employer] [address]. 1407 [facility] [place], [address] [place]. All rights reserved. This information is not intended as a substitute for professional medical care. Always follow your healthcare professional's instructions.         Vitamin

## 2019-05-24 ENCOUNTER — TELEPHONE (OUTPATIENT)
Dept: FAMILY MEDICINE CLINIC | Facility: CLINIC | Age: 24
End: 2019-05-24

## 2019-05-24 NOTE — TELEPHONE ENCOUNTER
LM on  at [phone] to call back to confirm received results through [employer].      Notes recorded by Sandy Moody DO on 5/24/2019 at 2:17 PM CDT  Mychart notified:  Your labs are normal except your lipid panel is mildly elevated.  Treatment for mild

## 2019-05-26 NOTE — PROGRESS NOTES
[facility]   Neurology- INITIAL CLINIC VISIT  2019, 12:25 PM     Leonid Toure Patient Status:  No patient class for patient encounter    1995 MRN [id number]   [place] [facility] [place]ED[place]  8/2011    wisdom teeth   • OTHER SURGICAL HISTORY  08\13\10    [place] teeth removal   • PATIENT DENIES ANY SURGICAL HISTORY         Family History:  family history includes Arthritis in her mother; Breast Cancer in her paternal grandmoth intact. Cranial nerves II-XII: Optic discs were sharp. Pupils were round and reacted to light. Extraocular movements were full. There was no face, jaw, palate or tongue weakness or atrophy. Hearing was grossly intact.  Shoulder shrug was normal.   Motor e Neurology  [facility] +Bilateral irregular s/p past eye surgeries.

## 2019-05-28 ENCOUNTER — TELEPHONE (OUTPATIENT)
Dept: FAMILY MEDICINE CLINIC | Facility: CLINIC | Age: 24
End: 2019-05-28

## 2019-05-29 ENCOUNTER — TELEPHONE (OUTPATIENT)
Dept: FAMILY MEDICINE CLINIC | Facility: CLINIC | Age: 24
End: 2019-05-29

## 2019-05-29 ENCOUNTER — HOSPITAL ENCOUNTER (OUTPATIENT)
Dept: PHYSICAL THERAPY | Facility: HOSPITAL | Age: 24
Setting detail: THERAPIES SERIES
Discharge: HOME | End: 2019-05-29
Attending: FAMILY MEDICINE
Payer: COMMERCIAL

## 2019-05-29 DIAGNOSIS — M54.81 CERVICO-OCCIPITAL NEURALGIA: Primary | ICD-10-CM

## 2019-05-29 PROCEDURE — 97110 THERAPEUTIC EXERCISES: CPT

## 2019-05-29 PROCEDURE — 97140 MANUAL THERAPY 1/> REGIONS: CPT

## 2019-05-29 NOTE — TELEPHONE ENCOUNTER
Patient requests referral for continued visit to neurologist. Patient prefers to continue seeing Dr. Bernardo Krishnamurthy. Referral pended.     Future Appointments   Date Time Provider Camilo Lloyd   5/31/2019  9:40 AM Brina Lopez DO [facility]

## 2019-05-29 NOTE — TELEPHONE ENCOUNTER
Pt called stating she needs a referral renewal for her neurologist. Pt preferred to go see the neurologist that Dr. Rodger Medina provided.

## 2019-05-29 NOTE — TELEPHONE ENCOUNTER
LM stating referral has been signed by pcp and to contact office with additional questions. Provided pt office phone [phone] along with office hours given.

## 2019-05-29 NOTE — PROGRESS NOTES
Dx: cervico-occipital neuralgia          Authorized # of Visits:  16 through 7/3        Next MD visit: none scheduled  Fall Risk: standard         Precautions: n/a             Subjective: Patient reports feeling better since getting back to her exercises.

## 2019-05-31 ENCOUNTER — OFFICE VISIT (OUTPATIENT)
Dept: NEUROLOGY | Facility: CLINIC | Age: 24
End: 2019-05-31

## 2019-05-31 VITALS
SYSTOLIC BLOOD PRESSURE: 100 MMHG | BODY MASS INDEX: 25 KG/M2 | HEART RATE: 84 BPM | WEIGHT: 149 LBS | DIASTOLIC BLOOD PRESSURE: 66 MMHG | RESPIRATION RATE: 16 BRPM

## 2019-05-31 DIAGNOSIS — M54.81 BILATERAL OCCIPITAL NEURALGIA: Primary | ICD-10-CM

## 2019-05-31 DIAGNOSIS — G44.86 CERVICOGENIC HEADACHE: ICD-10-CM

## 2019-05-31 PROCEDURE — 99213 OFFICE O/P EST LOW 20 MIN: CPT | Performed by: OTHER

## 2019-05-31 RX ORDER — NORTRIPTYLINE HYDROCHLORIDE 50 MG/1
50 CAPSULE ORAL NIGHTLY
Qty: 30 CAPSULE | Refills: 2 | Status: SHIPPED | OUTPATIENT
Start: 2019-05-31 | End: 2019-11-08

## 2019-05-31 NOTE — PROGRESS NOTES
[facility]   Neurology- f/u    Alayna Villalba Patient Status:  No patient class for patient encounter    1995 MRN [id number]   Location [address] [place] [address], [address] [place] [address], [address] [place] [address] REE Albarado DO Medical History:  Past Medical History:   Diagnosis Date   • Bilateral occipital neuralgia    • Esophageal reflux         Past Surgical History:  Past Surgical History:   Procedure Laterality Date   • COLONOSCOPY  10/2018    MAC: internal hemorrhoids   • E 4000mg daily, Disp: 100 tablet, Rfl: 0      Review of Systems:   A comprehensive 10 point review of systems was completed.     Pertinent positives and negatives noted in the HPI.          PHYSICAL EXAM:   Neurologic Exam  Vitals   05/31/19  0942   BP: 100/6 nightly  Continue gabapentin 600mg TID  Decrease tizanidine to 2mg x 2 weeks, then stop  Continue PT    Requested Prescriptions      No prescriptions requested or ordered in this encounter          We discussed in depth regarding the diagnosis, prognosis,

## 2019-06-05 ENCOUNTER — HOSPITAL ENCOUNTER (OUTPATIENT)
Dept: PHYSICAL THERAPY | Facility: HOSPITAL | Age: 24
Setting detail: THERAPIES SERIES
Discharge: HOME | End: 2019-06-05
Attending: FAMILY MEDICINE
Payer: COMMERCIAL

## 2019-06-05 PROCEDURE — 97110 THERAPEUTIC EXERCISES: CPT

## 2019-06-05 PROCEDURE — 97140 MANUAL THERAPY 1/> REGIONS: CPT

## 2019-06-05 NOTE — PROGRESS NOTES
Dx: cervico-occipital neuralgia          Authorized # of Visits:  16 through 7/3        Next MD visit: none scheduled  Fall Risk: standard         Precautions: n/a             Subjective: Patient had 1 day of increased symptoms this week but has felt lawanad manipulation S/l R C1,2 UPA, gr III+, 5 min                  Charges: Jake 2( 30 min) manual x 1 ( 15 min)   Total Timed Treatment: 45 min     Total Treatment Time: 45 min

## 2019-06-12 ENCOUNTER — HOSPITAL ENCOUNTER (OUTPATIENT)
Dept: PHYSICAL THERAPY | Facility: HOSPITAL | Age: 24
Setting detail: THERAPIES SERIES
Discharge: HOME | End: 2019-06-12
Attending: FAMILY MEDICINE
Payer: COMMERCIAL

## 2019-06-12 PROCEDURE — 97110 THERAPEUTIC EXERCISES: CPT

## 2019-06-12 PROCEDURE — 97140 MANUAL THERAPY 1/> REGIONS: CPT

## 2019-06-12 NOTE — PROGRESS NOTES
Dx: cervico-occipital neuralgia          Authorized # of Visits:  16 through 7/3        Next MD visit: none scheduled  Fall Risk: standard         Precautions: n/a             Subjective: Patient had pain in the suboccipital area yesterday and this morning 2x10 Cable press, 25#, 2x10 Cable uppercut, 15#, 2x15 Scalene STM 10 min      R upper trap STM w/ movement (L rotation), 5 min R upper trap STM w/ movement (L rotation), 5 min R paraspinal STM w/ active rotation, 5 min Manually resisted side bend, [id number]

## 2019-06-19 ENCOUNTER — HOSPITAL ENCOUNTER (OUTPATIENT)
Dept: PHYSICAL THERAPY | Facility: HOSPITAL | Age: 24
Setting detail: THERAPIES SERIES
Discharge: HOME | End: 2019-06-19
Attending: FAMILY MEDICINE
Payer: COMMERCIAL

## 2019-06-19 PROCEDURE — 97110 THERAPEUTIC EXERCISES: CPT

## 2019-06-19 PROCEDURE — 97140 MANUAL THERAPY 1/> REGIONS: CPT

## 2019-06-19 NOTE — PROGRESS NOTES
Dx: cervico-occipital neuralgia          Authorized # of Visits:  16 through 7/3        Next MD visit: none scheduled  Fall Risk: standard         Precautions: n/a             Subjective: Patient reports pain has been better since last week.  Mild soreness Cable press, 20#, 2x10 Cable press, 25#, 2x10 Cable uppercut, 15#, 2x15 Scalene STM 10 min Cable punch, 15#, 3x10     R upper trap STM w/ movement (L rotation), 5 min R upper trap STM w/ movement (L rotation), 5 min R paraspinal STM w/ active rotation, 5

## 2019-06-26 ENCOUNTER — HOSPITAL ENCOUNTER (OUTPATIENT)
Dept: PHYSICAL THERAPY | Facility: HOSPITAL | Age: 24
Setting detail: THERAPIES SERIES
Discharge: HOME | End: 2019-06-26
Attending: FAMILY MEDICINE
Payer: COMMERCIAL

## 2019-06-26 ENCOUNTER — TELEPHONE (OUTPATIENT)
Dept: FAMILY MEDICINE CLINIC | Facility: CLINIC | Age: 24
End: 2019-06-26

## 2019-06-26 DIAGNOSIS — M54.81 BILATERAL OCCIPITAL NEURALGIA: Primary | Chronic | ICD-10-CM

## 2019-06-26 PROCEDURE — 97110 THERAPEUTIC EXERCISES: CPT

## 2019-06-26 PROCEDURE — 97140 MANUAL THERAPY 1/> REGIONS: CPT

## 2019-06-26 NOTE — PROGRESS NOTES
Dx: cervico-occipital neuralgia          Authorized # of Visits:  16 through 7/3        Next MD visit: none scheduled  Fall Risk: standard         Precautions: n/a             Subjective: Patient reports pain has been better since last week.  Mild soreness 3x10 SB plank and hold, [id number] Manually resisted cervical extension, SB, rotation, 8 min total Modified plank with shoulder raise, 3x5 10# bent over row 10# bent over row, 2x15    Cable press, 20#, 2x10 Cable press, 25#, 2x10 Cable uppercut, 15#, 2x15 Sc

## 2019-07-02 ENCOUNTER — HOSPITAL ENCOUNTER (OUTPATIENT)
Dept: GENERAL RADIOLOGY | Age: 24
Discharge: HOME | End: 2019-07-02
Attending: FAMILY MEDICINE
Payer: COMMERCIAL

## 2019-07-02 ENCOUNTER — HOSPITAL ENCOUNTER (OUTPATIENT)
Dept: PHYSICAL THERAPY | Facility: HOSPITAL | Age: 24
Setting detail: THERAPIES SERIES
Discharge: HOME | End: 2019-07-02
Attending: FAMILY MEDICINE
Payer: COMMERCIAL

## 2019-07-02 ENCOUNTER — OFFICE VISIT (OUTPATIENT)
Dept: FAMILY MEDICINE CLINIC | Facility: CLINIC | Age: 24
End: 2019-07-02

## 2019-07-02 VITALS
HEART RATE: 88 BPM | OXYGEN SATURATION: 97 % | DIASTOLIC BLOOD PRESSURE: 68 MMHG | BODY MASS INDEX: 25 KG/M2 | RESPIRATION RATE: 18 BRPM | SYSTOLIC BLOOD PRESSURE: 106 MMHG | TEMPERATURE: 99 F | WEIGHT: 148.63 LBS

## 2019-07-02 DIAGNOSIS — IMO0001 GRADE 2 ANKLE SPRAIN, LEFT, INITIAL ENCOUNTER: ICD-10-CM

## 2019-07-02 DIAGNOSIS — S99.912A INJURY OF LEFT ANKLE, INITIAL ENCOUNTER: ICD-10-CM

## 2019-07-02 DIAGNOSIS — S99.912A INJURY OF LEFT ANKLE, INITIAL ENCOUNTER: Primary | ICD-10-CM

## 2019-07-02 PROCEDURE — 99213 OFFICE O/P EST LOW 20 MIN: CPT | Performed by: FAMILY MEDICINE

## 2019-07-02 PROCEDURE — 73610 X-RAY EXAM OF ANKLE: CPT | Performed by: FAMILY MEDICINE

## 2019-07-02 PROCEDURE — E0114 CRUTCH UNDERARM PAIR NO WOOD: HCPCS | Performed by: FAMILY MEDICINE

## 2019-07-02 PROCEDURE — 97110 THERAPEUTIC EXERCISES: CPT

## 2019-07-02 PROCEDURE — 97140 MANUAL THERAPY 1/> REGIONS: CPT

## 2019-07-02 NOTE — PATIENT INSTRUCTIONS
As of October 6th 2014, the Drug Enforcement Agency [facility]) is reclassifying all hydrocodone combination medications from Schedule III to Schedule II. This includes medications such as Norco, Vicodin, Lortab, Zohydro, and Vicoprofen.      What this means for Recovering from a strain or sprain may take 6 to 8 weeks. Your self-care goal is to reduce pain and immobilize the injury to speed healing. A sprain injures ligaments (tissue that connects bones to bones).       A strain injures muscles or tendons (tiss pressing along the injured area, you notice a spot that is especially painful  Date Last Reviewed: 5/1/2018  © 4959-1743 [employer] [address]. 1407 [facility], [address] [place] [address]. All rights reserved.  This information is not intended as a substi hours.  · When you’re ready, return slowly to your normal activities. Rest the injured area often. · Don’t use or walk on an injured limb if it hurts. Date Last Reviewed: 1/1/2018  © 8753-9033 [employer] [address].  [address] [place], [place] should have nonskid rubber tips to prevent slipping. Change tips that look worn. · Don’t let your armpits rest on the pads — this can cause tingling, numbness, and loss of muscle strength in your arm.   · Don’t use crutches that are too short, too long, or balanced standing (tripod) position. · Squeeze the crutch pads against the sides of your chest.  · The bottom tips of the crutches should be wide enough apart for you to move easily between them. · Support your weight on your hands, not on your armpits. should be slightly bent when holding the hand . When your arms hang down, the crutch handle should be at the top of your hip.   Crutch walking  Place the crutches forward about 1 foot in front of you.  The crutches should be a little farther apart than support your weight. Raise your strong leg onto the next higher step. · Transfer all your weight to your strong leg (still bent) as you move the crutches up (while holding on to the handrail) to the next step next to the strong leg.   · Keep your weight ev [place], [address] [place]. All rights reserved. This information is not intended as a substitute for professional medical care. Always follow your healthcare professional's instructions.         Step-by-Step  Using Crutches with Step To (Weight Bearing)    Date [place] Last Reviewed: 7/9/2015  © 7262-5232 [employer] [address]. 1407 [facility], [address] [place]. All rights reserved. This information is not intended as a substitute for professional medical care.  Always follow your healthcare professional's ins

## 2019-07-02 NOTE — PROGRESS NOTES
CHIEF COMPLAINT: Patient presents with: Ankle Injury: fell 3 weeks ago; Lt ankle tender to the touch     HPI:     Jer Zhao is a 21year old female presents for injury to left ankle occurred 2 weeks ago when fell.   She is unsure how she twisted her Medications:  Nortriptyline HCl 25 MG Oral Cap Take 1 capsule (25 mg total) by mouth nightly.  Disp: 90 capsule Rfl: 3   LESSINA 0.1-20 MG-MCG Oral Tab TAKE 1 TABLET BY MOUTH DAILY Disp: 84 tablet Rfl: 0   gabapentin 600 MG Oral Tab TAKE 1 TABLET(600 MG) BY Pedal pulse +2 bilateral.  Cardio: pedal pulse +2 bilateral  Neuro: AOx3.sensation of ankle/foot intact. UE DTR +2/4 bilateral and equal and symmetric. No antalgic gait    LABS        REVIEWED THIS VISIT  ASSESSMENT/PLAN:   21year old female with    1.  G DO      Patient understands plan and follow-up. Return in about 3 weeks (around 7/23/2019) for recheck symptoms.

## 2019-07-02 NOTE — PROGRESS NOTES
Dx: cervico-occipital neuralgia          Authorized # of Visits:  16 through 7/3        Next MD visit: none scheduled  Fall Risk: standard         Precautions: n/a             Subjective: Patient reports that she has not had any issues with the neck since extension, SB, rotation, 8 min total Modified plank with shoulder raise, 3x5 10# bent over row 10# bent over row, 2x15 TRX row, 20  Single arm, 2x10   Cable press, 20#, 2x10 Cable press, 25#, 2x10 Cable uppercut, 15#, 2x15 Scalene STM 10 min Cable punch, 1

## 2019-07-03 ENCOUNTER — TELEPHONE (OUTPATIENT)
Dept: FAMILY MEDICINE CLINIC | Facility: CLINIC | Age: 24
End: 2019-07-03

## 2019-07-03 PROBLEM — S99.912A INJURY OF LEFT ANKLE: Status: ACTIVE | Noted: 2019-07-03

## 2019-07-03 PROBLEM — IMO0001 GRADE 2 ANKLE SPRAIN, LEFT, INITIAL ENCOUNTER: Status: ACTIVE | Noted: 2019-07-03

## 2019-07-03 NOTE — TELEPHONE ENCOUNTER
S/w Chalino Mcrae    Informed patient may [facility] cam walker boot [facility]  Also informed DME referral has been placed with HMO.  Patient is aware most insurance companies do not cover the cost of crutches and durabl

## 2019-07-03 NOTE — TELEPHONE ENCOUNTER
LMOM to return call to the office. Provided pt office phone [phone] along with office hours given.

## 2019-07-03 NOTE — TELEPHONE ENCOUNTER
[facility]- durable medical [employer]. Referral placed  [address] [facility]   [place], [address] [facility]  [phone]

## 2019-07-05 ENCOUNTER — TELEPHONE (OUTPATIENT)
Dept: INTERNAL MEDICINE CLINIC | Facility: CLINIC | Age: 24
End: 2019-07-05

## 2019-07-05 NOTE — TELEPHONE ENCOUNTER
LM on VM informing test results have been released to my chart patient to call back with any further questions.

## 2019-07-05 NOTE — TELEPHONE ENCOUNTER
Result Notes for XR ANKLE (MIN 3 VIEWS), LEFT (CPT=73610)     Notes recorded by Marlon Ralph DO on 7/3/2019 at 1:21 AM CDT  Results reviewed. Released to [address] Tests show no significant abnormalities.

## 2019-07-08 ENCOUNTER — PATIENT MESSAGE (OUTPATIENT)
Dept: FAMILY MEDICINE CLINIC | Facility: CLINIC | Age: 24
End: 2019-07-08

## 2019-07-09 ENCOUNTER — HOSPITAL ENCOUNTER (OUTPATIENT)
Dept: PHYSICAL THERAPY | Facility: HOSPITAL | Age: 24
Setting detail: THERAPIES SERIES
Discharge: HOME | End: 2019-07-09
Attending: FAMILY MEDICINE
Payer: COMMERCIAL

## 2019-07-09 PROCEDURE — 97110 THERAPEUTIC EXERCISES: CPT

## 2019-07-09 PROCEDURE — 97140 MANUAL THERAPY 1/> REGIONS: CPT

## 2019-07-09 NOTE — PROGRESS NOTES
Dx: cervico-occipital neuralgia          Authorized # of Visits:  16 through 7/3        Next MD visit: none scheduled  Fall Risk: standard         Precautions: n/a             Subjective: Patient reports that she had slight soreness looking up at the ceili SB plank and hold, [id number] Manually resisted cervical extension, SB, rotation, 8 min total Modified plank with shoulder raise, 3x5 10# bent over row 10# bent over row, 2x15 TRX row, 20  Single arm, 2x10 TRX row, 20  Single arm w/ chin tuck, 2x10   Cable p

## 2019-07-09 NOTE — PROGRESS NOTES
S/w Ana Rosa Osullivan    Informed that 4 different medical equipment locations were contacted and [address] was willing to bill insurance for the cam boot only due to crutches not being covered by [facility] plan.  Informed patient referral was updated and we a

## 2019-07-09 NOTE — TELEPHONE ENCOUNTER
Please have patient contacted [facility] for referral approvals. I have recommended crutches since not improving at last visit. Also very difficult to walk with a cam walker boot without crutches.   Walking boot or cam walker boot typically will have 3 large V

## 2019-07-10 ENCOUNTER — TELEPHONE (OUTPATIENT)
Dept: FAMILY MEDICINE CLINIC | Facility: CLINIC | Age: 24
End: 2019-07-10

## 2019-07-10 NOTE — TELEPHONE ENCOUNTER
Patient informed referral to [facility] clinic approved for cam walker boot. Patient was provided information to the clinic. Patient v/u and was thankful for the effort. Patient will call .

## 2019-07-15 ENCOUNTER — TELEPHONE (OUTPATIENT)
Dept: FAMILY MEDICINE CLINIC | Facility: CLINIC | Age: 24
End: 2019-07-15

## 2019-07-15 NOTE — TELEPHONE ENCOUNTER
Pt states the [address] never received the referral for her boot. Pt wants to know if it can be sent to them again via fax. Fax # [phone].

## 2019-08-04 DIAGNOSIS — Z30.41 ENCOUNTER FOR SURVEILLANCE OF CONTRACEPTIVE PILLS: ICD-10-CM

## 2019-08-04 DIAGNOSIS — N94.6 DYSMENORRHEA: ICD-10-CM

## 2019-08-05 RX ORDER — LEVONORGESTREL AND ETHINYL ESTRADIOL 0.1-0.02MG
KIT ORAL
Qty: 84 TABLET | Refills: 0 | Status: SHIPPED | OUTPATIENT
Start: 2019-08-05 | End: 2019-10-31

## 2019-08-05 NOTE — TELEPHONE ENCOUNTER
Pt requesting refill of Jd Bryan,, refill approved, sent to pharmacy:     Last Time Medication was Filled:    LESSINA 0.1-20 MG-MCG Oral Tab 84 tablet 0 5/6/2019    Sig: Ehsan Lux 1 TABLET BY MOUTH DAILY     Route:   (none)     Order #:   [id number]

## 2019-08-12 ENCOUNTER — OFFICE VISIT (OUTPATIENT)
Dept: FAMILY MEDICINE CLINIC | Facility: CLINIC | Age: 24
End: 2019-08-12

## 2019-08-12 VITALS
WEIGHT: 149 LBS | TEMPERATURE: 99 F | DIASTOLIC BLOOD PRESSURE: 74 MMHG | RESPIRATION RATE: 16 BRPM | BODY MASS INDEX: 25.13 KG/M2 | HEART RATE: 104 BPM | SYSTOLIC BLOOD PRESSURE: 108 MMHG | HEIGHT: 64.7 IN

## 2019-08-12 DIAGNOSIS — IMO0001 GRADE 2 ANKLE SPRAIN, LEFT, SUBSEQUENT ENCOUNTER: Primary | ICD-10-CM

## 2019-08-12 DIAGNOSIS — S99.912D INJURY OF LEFT ANKLE, SUBSEQUENT ENCOUNTER: ICD-10-CM

## 2019-08-12 PROCEDURE — 99213 OFFICE O/P EST LOW 20 MIN: CPT | Performed by: FAMILY MEDICINE

## 2019-08-12 NOTE — PROGRESS NOTES
CHIEF COMPLAINT: Patient presents with:   Follow - Up: left ankle symptoms improving     HPI:     Alayna Villalba is a 21year old female presents for injury to left ankle occurred 2 weeks ago-mid June 2019 when fell off exercise bike and landed between th Mother    • Breast Cancer Paternal Grandmother    • High Blood Pressure Maternal Grandfather    • Stroke Maternal Grandfather    • Cancer Paternal Grandfather         colon, bladder   • No Known Problems Sister       Social History: Social History    Mariam bilateral.  Ankle tendons and Achilles attendance intact bilateral.  Ankle strength is plus 5 out of 5 bilateral equal and symmetric but plantar and dorsiflexion of left ankle are painful over left lateral malleolus.   Negative Keen's test.  No knee hannah benign. Patient has been abstaining from activity and exercising or riding bike. CAM Walker boot-remove at bedtime, showering  X-ray is unremarkable  History of gastritis and upper EGD. Will hold on anti-inflammatories.   May use Tylenol OTC/acetaminop

## 2019-10-01 ENCOUNTER — OFFICE VISIT (OUTPATIENT)
Dept: NEUROLOGY | Facility: CLINIC | Age: 24
End: 2019-10-01

## 2019-10-01 VITALS
SYSTOLIC BLOOD PRESSURE: 109 MMHG | RESPIRATION RATE: 16 BRPM | BODY MASS INDEX: 25.97 KG/M2 | HEART RATE: 111 BPM | DIASTOLIC BLOOD PRESSURE: 79 MMHG | HEIGHT: 64.5 IN | WEIGHT: 154 LBS

## 2019-10-01 DIAGNOSIS — M54.2 CHRONIC NECK PAIN: ICD-10-CM

## 2019-10-01 DIAGNOSIS — M54.81 BILATERAL OCCIPITAL NEURALGIA: ICD-10-CM

## 2019-10-01 DIAGNOSIS — G89.29 CHRONIC NECK PAIN: ICD-10-CM

## 2019-10-01 PROCEDURE — 99213 OFFICE O/P EST LOW 20 MIN: CPT | Performed by: OTHER

## 2019-10-01 RX ORDER — GABAPENTIN 600 MG/1
TABLET ORAL
Qty: 270 TABLET | Refills: 3 | Status: SHIPPED | OUTPATIENT
Start: 2019-10-01 | End: 2019-11-10

## 2019-10-01 NOTE — PROGRESS NOTES
[facility]   Neurology- f/u    Lyle Longoria Patient Status:  No patient class for patient encounter    1995 MRN [id number]   Location ED [facility], [address], [address] [facility] PCP Romeo Cano DO burning pain up her left lateral neck. Pertinent imaging and laboratory work-up:   XR cervical 1/2019  CONCLUSION:  Essentially normal cervical spine radiographs. If clinical symptoms persist then recommend MRI.     Past Medical History:  Past Medical needed for Pain. Max dose: 6 tablets or less than 4000mg daily, Disp: 100 tablet, Rfl: 0      Review of Systems:   A comprehensive 10 point review of systems was completed.     Pertinent positives and negatives noted in the HPI.          PHYSICAL EXAM:   Ne gabapentin 600mg TID  Continue tizanidine 2mg nightly, try to wean  Continue PT exercises  Exercise on back supine with chin tuck, and lifting head, helps       Requested Prescriptions      No prescriptions requested or ordered in this encounter          W

## 2019-10-03 ENCOUNTER — IMMUNIZATION (OUTPATIENT)
Dept: FAMILY MEDICINE CLINIC | Facility: CLINIC | Age: 24
End: 2019-10-03

## 2019-10-03 DIAGNOSIS — Z23 NEED FOR VACCINATION: ICD-10-CM

## 2019-10-03 PROCEDURE — 90471 IMMUNIZATION ADMIN: CPT | Performed by: PHYSICIAN ASSISTANT

## 2019-10-03 PROCEDURE — 90686 IIV4 VACC NO PRSV 0.5 ML IM: CPT | Performed by: PHYSICIAN ASSISTANT

## 2019-10-31 DIAGNOSIS — N94.6 DYSMENORRHEA: ICD-10-CM

## 2019-10-31 DIAGNOSIS — Z30.41 ENCOUNTER FOR SURVEILLANCE OF CONTRACEPTIVE PILLS: ICD-10-CM

## 2019-10-31 RX ORDER — LEVONORGESTREL AND ETHINYL ESTRADIOL 0.1-0.02MG
KIT ORAL
Qty: 84 TABLET | Refills: 1 | Status: SHIPPED | OUTPATIENT
Start: 2019-10-31 | End: 2020-04-15

## 2019-10-31 NOTE — TELEPHONE ENCOUNTER
Requested Prescriptions     Signed Prescriptions Disp Refills   • LESSINA 0.1-20 MG-MCG Oral Tab 84 tablet 1     Sig: TAKE 1 TABLET BY MOUTH DAILY     Authorizing Provider: Neida Vallecillo     Ordering User: Clarence Diego       Pt requesting refill of les

## 2019-11-08 DIAGNOSIS — M54.2 CHRONIC NECK PAIN: ICD-10-CM

## 2019-11-08 DIAGNOSIS — G89.29 CHRONIC NECK PAIN: ICD-10-CM

## 2019-11-08 DIAGNOSIS — M54.81 BILATERAL OCCIPITAL NEURALGIA: ICD-10-CM

## 2019-11-10 RX ORDER — GABAPENTIN 600 MG/1
TABLET ORAL
Qty: 270 TABLET | Refills: 3 | Status: SHIPPED | OUTPATIENT
Start: 2019-11-10 | End: 2020-06-15

## 2019-11-10 RX ORDER — NORTRIPTYLINE HYDROCHLORIDE 50 MG/1
50 CAPSULE ORAL NIGHTLY
Qty: 90 CAPSULE | Refills: 3 | Status: SHIPPED | OUTPATIENT
Start: 2019-11-10 | End: 2020-06-15

## 2019-11-10 RX ORDER — TIZANIDINE 4 MG/1
TABLET ORAL
Qty: 90 TABLET | Refills: 3 | Status: SHIPPED | OUTPATIENT
Start: 2019-11-10 | End: 2020-06-15

## 2020-04-15 DIAGNOSIS — Z30.41 ENCOUNTER FOR SURVEILLANCE OF CONTRACEPTIVE PILLS: ICD-10-CM

## 2020-04-15 DIAGNOSIS — N94.6 DYSMENORRHEA: ICD-10-CM

## 2020-04-15 RX ORDER — LEVONORGESTREL AND ETHINYL ESTRADIOL 0.1-0.02MG
KIT ORAL
Qty: 84 TABLET | Refills: 0 | Status: SHIPPED | OUTPATIENT
Start: 2020-04-15 | End: 2020-07-06

## 2020-04-20 ENCOUNTER — PATIENT MESSAGE (OUTPATIENT)
Dept: FAMILY MEDICINE CLINIC | Facility: CLINIC | Age: 25
End: 2020-04-20

## 2020-04-20 DIAGNOSIS — R12 HEARTBURN: ICD-10-CM

## 2020-04-20 DIAGNOSIS — K29.00 OTHER ACUTE GASTRITIS WITHOUT HEMORRHAGE: ICD-10-CM

## 2020-04-20 RX ORDER — OMEPRAZOLE 40 MG/1
CAPSULE, DELAYED RELEASE ORAL
Qty: 90 CAPSULE | Refills: 0 | Status: SHIPPED | OUTPATIENT
Start: 2020-04-20 | End: 2020-07-06

## 2020-04-20 NOTE — TELEPHONE ENCOUNTER
Stop aspirin, any nsaids  Restart omeprazole 40 mg daily  Stop looking down with puzzles. Go for walk or watch TV with neck pain. Pt with occipital neuroalgia and start stretches.  Has tizanidine gabapentin for neck pain  Schedule OV 1 week  Sooner if worse

## 2020-04-20 NOTE — TELEPHONE ENCOUNTER
From: Keturah Palomino  To: Latrell Beal DO  Sent: 4/20/2020 10:52 AM CDT  Subject: Other    Dr. Sonny Carnes, I had been getting more neck pain\ headaches due to looking down for hours doing puzzles.  I started taking asprin too much again, but I have stoppe

## 2020-04-21 ENCOUNTER — PATIENT MESSAGE (OUTPATIENT)
Dept: FAMILY MEDICINE CLINIC | Facility: CLINIC | Age: 25
End: 2020-04-21

## 2020-04-21 NOTE — TELEPHONE ENCOUNTER
From: Aura Richmond  To: Domitila Trujillo DO  Sent: 4/21/2020 3:49 PM CDT  Subject: Other    Dr. Morena Crowe,   I just had a couple more questions: How long would you want me to stay on the heartburn medication?  Also I will be picking the heartburn medicatio

## 2020-04-21 NOTE — TELEPHONE ENCOUNTER
Patient should see improvement in 1 week and will take at least 4 weeks to heal but would like patient to take for the next 3 months then may discontinue and observe if any symptoms. Patient should avoid NSAIDs and all aspirin products.   Any worsening abd

## 2020-04-28 ENCOUNTER — PATIENT MESSAGE (OUTPATIENT)
Dept: FAMILY MEDICINE CLINIC | Facility: CLINIC | Age: 25
End: 2020-04-28

## 2020-04-29 ENCOUNTER — PATIENT MESSAGE (OUTPATIENT)
Dept: FAMILY MEDICINE CLINIC | Facility: CLINIC | Age: 25
End: 2020-04-29

## 2020-04-29 RX ORDER — ONDANSETRON 8 MG/1
8 TABLET, ORALLY DISINTEGRATING ORAL EVERY 8 HOURS PRN
Qty: 8 TABLET | Refills: 0 | Status: SHIPPED | OUTPATIENT
Start: 2020-04-29 | End: 2020-06-03

## 2020-04-29 NOTE — TELEPHONE ENCOUNTER
Pt may take OTC omeprazole 20mg daily but not with famotidine. Similar affect of reducing acid in stomach.  Pt needs to chose one for 90 days to allow stomach to heal. Omeprazole is stronger at acid reduction but slightly higher side effect- ie pt with chandra

## 2020-04-29 NOTE — TELEPHONE ENCOUNTER
From: Keturah Palomino  To:  Latrell Beal DO  Sent: 4/28/2020 2:40 PM CDT  Subject: Other    Dr. Sonny Carnes,   I started taking the heartburn medicine on Saturday and that seems to be going good, I do get a little nauseas after I take it but I'm sure that w

## 2020-04-29 NOTE — TELEPHONE ENCOUNTER
From: Sarwat Martínez  To:  Shahida Patel DO  Sent: 4/29/2020 10:01 AM CDT  Subject: Other    Dr. Piotr Ibarra,   I will stop taking the pepcid, I was only taking it because the last time I was on the heartburn medication I took Zantac for the first week leonor

## 2020-05-07 ENCOUNTER — PATIENT MESSAGE (OUTPATIENT)
Dept: FAMILY MEDICINE CLINIC | Facility: CLINIC | Age: 25
End: 2020-05-07

## 2020-05-08 NOTE — TELEPHONE ENCOUNTER
From: Chinedu River  To: Malachi Stallings DO  Sent: 5/7/2020 4:28 PM CDT  Subject: Other    Dr. July Atwood,   Just wanted to update you on how I'm doing. My stomach seems to be getting better once I stopped taking the other heartburn medicine.  My stomach is

## 2020-05-08 NOTE — TELEPHONE ENCOUNTER
Noted  Pt to FU if new or worse symptoms  Avoid nsaids and use tylenol if needed for analgesic  Continue heartburn diet and avoid trigger foods. Please inform.

## 2020-06-02 ENCOUNTER — TELEPHONE (OUTPATIENT)
Dept: FAMILY MEDICINE CLINIC | Facility: CLINIC | Age: 25
End: 2020-06-02

## 2020-06-02 NOTE — TELEPHONE ENCOUNTER
Pt called office stating she is having stomach issues, nausea, and a burning sensation. Pt made a phone visit for 06/03/2020.

## 2020-06-03 ENCOUNTER — VIRTUAL PHONE E/M (OUTPATIENT)
Dept: FAMILY MEDICINE CLINIC | Facility: CLINIC | Age: 25
End: 2020-06-03
Payer: MEDICAID

## 2020-06-03 DIAGNOSIS — R11.0 NAUSEA: Primary | ICD-10-CM

## 2020-06-03 DIAGNOSIS — K29.50 OTHER CHRONIC GASTRITIS WITHOUT HEMORRHAGE: ICD-10-CM

## 2020-06-03 PROCEDURE — 99214 OFFICE O/P EST MOD 30 MIN: CPT | Performed by: FAMILY MEDICINE

## 2020-06-03 RX ORDER — ONDANSETRON 8 MG/1
8 TABLET, ORALLY DISINTEGRATING ORAL EVERY 8 HOURS PRN
Qty: 8 TABLET | Refills: 0 | Status: SHIPPED | OUTPATIENT
Start: 2020-06-03 | End: 2020-07-17

## 2020-06-03 NOTE — PROGRESS NOTES
Due to COVID-19 ACTION PLAN, the patient's office visit was converted to a phone or video visit.   Time Spent: 20 mins    Subjective     HPI:   Yris Waller is a 31year old female who presents for heartburn on omeprazole 40mg daily and on 2nd month fee Osmolality 05/23/2019 288    • GFR, Non- 05/23/2019 117    • GFR, -American 05/23/2019 135    • AST 05/23/2019 15    • ALT 05/23/2019 25    • Alkaline Phosphatase 05/23/2019 81    • Bilirubin, Total 05/23/2019 0.4    • Total Protein Dispersible; Take 1 tablet (8 mg total) by mouth every 8 (eight) hours as needed for Nausea. Reinforced healthy diet, lifestyle, and exercise.     Return in about 2 weeks (around 6/17/2020), or if symptoms worsen or fail to improve-her schedule annual

## 2020-06-03 NOTE — PATIENT INSTRUCTIONS
Understanding Gastritis  Gastritis is a painful inflammation of the stomach lining. It has a number of causes. Gastritis and its symptoms can be eased with treatment. Work with your healthcare provider to find ways to treat your symptoms.      The stomach Your healthcare provider will evaluate you to find out the cause of your symptoms. This may include a review of your health history, a physical exam, and some tests. Then, treatment can start.  Treatment may include taking certain medicines and making some © 7539-1030 [employer] [address]. [address] [facility], [facility] [place]. All rights reserved. This information is not intended as a substitute for professional medical care. Always follow your healthcare professional's instructions.         [employer] ? Proton pump inhibitors. These block your stomach from making any acid. ? H2 blockers. These reduce the amount of acid your stomach makes. ? Bismuth subsalicylate. This helps protect the lining of your stomach from acid.   · Don't take any NSAIDs during

## 2020-06-15 ENCOUNTER — OFFICE VISIT (OUTPATIENT)
Dept: NEUROLOGY | Facility: CLINIC | Age: 25
End: 2020-06-15
Payer: MEDICAID

## 2020-06-15 ENCOUNTER — TELEPHONE (OUTPATIENT)
Dept: NEUROLOGY | Facility: CLINIC | Age: 25
End: 2020-06-15

## 2020-06-15 VITALS
WEIGHT: 160 LBS | HEART RATE: 68 BPM | BODY MASS INDEX: 27 KG/M2 | SYSTOLIC BLOOD PRESSURE: 128 MMHG | DIASTOLIC BLOOD PRESSURE: 70 MMHG | RESPIRATION RATE: 16 BRPM

## 2020-06-15 DIAGNOSIS — M54.81 BILATERAL OCCIPITAL NEURALGIA: ICD-10-CM

## 2020-06-15 DIAGNOSIS — M54.2 CHRONIC NECK PAIN: ICD-10-CM

## 2020-06-15 DIAGNOSIS — M54.2 CERVICALGIA: Primary | Chronic | ICD-10-CM

## 2020-06-15 DIAGNOSIS — G43.009 MIGRAINE WITHOUT AURA AND WITHOUT STATUS MIGRAINOSUS, NOT INTRACTABLE: ICD-10-CM

## 2020-06-15 DIAGNOSIS — G89.29 CHRONIC NECK PAIN: ICD-10-CM

## 2020-06-15 DIAGNOSIS — G44.209 TENSION HEADACHE: ICD-10-CM

## 2020-06-15 PROCEDURE — 99215 OFFICE O/P EST HI 40 MIN: CPT | Performed by: OTHER

## 2020-06-15 RX ORDER — GABAPENTIN 600 MG/1
600 TABLET ORAL 3 TIMES DAILY
Qty: 360 TABLET | Refills: 3 | COMMUNITY
Start: 2020-06-15 | End: 2020-10-01

## 2020-06-15 RX ORDER — NORTRIPTYLINE HYDROCHLORIDE 75 MG/1
75 CAPSULE ORAL NIGHTLY
Qty: 30 CAPSULE | Refills: 5 | Status: SHIPPED | OUTPATIENT
Start: 2020-06-15 | End: 2020-06-29

## 2020-06-15 RX ORDER — TIZANIDINE 4 MG/1
TABLET ORAL
Qty: 90 TABLET | Refills: 3 | Status: SHIPPED | OUTPATIENT
Start: 2020-06-15 | End: 2020-10-01

## 2020-06-15 RX ORDER — GABAPENTIN 600 MG/1
600 TABLET ORAL 4 TIMES DAILY
Qty: 360 TABLET | Refills: 5 | Status: SHIPPED | OUTPATIENT
Start: 2020-06-15 | End: 2020-06-15

## 2020-06-15 RX ORDER — SUMATRIPTAN 50 MG/1
50 TABLET, FILM COATED ORAL EVERY 2 HOUR PRN
Qty: 12 TABLET | Refills: 5 | Status: SHIPPED | OUTPATIENT
Start: 2020-06-15 | End: 2020-10-01

## 2020-06-15 NOTE — TELEPHONE ENCOUNTER
Spoke with pharmacist Shey Randhawa and informed her the sig should be 600mg qid and to change refills to 3 instead of 5. Updated historically in Miguel.

## 2020-06-15 NOTE — PROGRESS NOTES
[facility]   Neurology- f/u    Margaret Unger Patient Status:  No patient class for patient encounter    1995 MRN [id number]   Location [address] [place] [address], [address] [place] [address] Drive, [address] [place] [address] PCP Silvino Holden DO tylenol now, she tried over the counter large dose asperin , it helped her HA, but it caused her stomach pain, she can not take it now, she stopped it.      Pertinent imaging and laboratory work-up:   XR cervical 1/2019  CONCLUSION:  Essentially normal cerv total) by mouth every 8 (eight) hours as needed for Nausea., Disp: 8 tablet, Rfl: 0  •  Omeprazole 40 MG Oral Capsule Delayed Release, TAKE 1 CAPSULE BY MOUTH EVERY DAY 45 MINUTES BEFORE BREAKFAST, Disp: 90 capsule, Rfl: 0  •  LESSINA 0.1-20 MG-MCG Oral Ta Sensory exam revealed normal light touch perception. Vibratory perception and proprioception were intact at the toes. Pinprick and temperature were normal. Romberg sign was absent. Complex motor skills revealed normal coordination.  Finger-nose-finger in

## 2020-06-18 ENCOUNTER — TELEPHONE (OUTPATIENT)
Dept: SURGERY | Facility: CLINIC | Age: 25
End: 2020-06-18

## 2020-06-18 DIAGNOSIS — M54.81 CERVICO-OCCIPITAL NEURALGIA: Primary | ICD-10-CM

## 2020-06-18 NOTE — TELEPHONE ENCOUNTER
Message left on mom's VM(ok per HIPAA consent) that patient's MRI was denied by insurance and Dr Jimbo Mills has ordered PT for her, PT # given. PT order placed.

## 2020-06-18 NOTE — TELEPHONE ENCOUNTER
Prior auth for cervical MRI 61061 Denied - peer to peer required    Patient is scheduled 06-    Case# [id number]    Peer to peer option is available until   06/24/2020. Call [phone]   option 1.   Hours 7 am - 7pm    Based on [facility]

## 2020-06-18 NOTE — TELEPHONE ENCOUNTER
Ivelisse Drake tell her to do neck PT first since MRI cervical spine denial,   Give her neck PT 2-3 times a week for 4-6 weeks,

## 2020-06-19 NOTE — TELEPHONE ENCOUNTER
Called today, I was told to schedule PP review, Dr. Mj Cleaning will call me Monday at 12 noon to have PP reviewing.  I give them office number , expect this call at 12 noon Monday on 6/22/2020

## 2020-06-19 NOTE — TELEPHONE ENCOUNTER
Called today, I was told to schedule PP review, Dr. Radha Diaz will call me Monday at 12 noon to have PP reviewing.  I give them office number , expect this call at 12 noon Monday on 6/22/2020

## 2020-06-19 NOTE — TELEPHONE ENCOUNTER
Cervical MRI cannot be overturned -Provider has option for Peer to Peer or Appeal     Case# [id number]     Peer to peer option is available until   06/24/2020.  Call [phone]   option 1.  Hours 7 am - 7pm    OR    APPEALS:   You, your doctor, or someon

## 2020-06-19 NOTE — TELEPHONE ENCOUNTER
Pt states she has done PT multiple times for her neck. This was also noted in Dr Lou Just office visit note. Will route to PA team to see if this can be overturned without peer to peer.

## 2020-06-22 NOTE — TELEPHONE ENCOUNTER
Peer to Peer Dr called today and provider was still in with a patient and the peer to peer Dr could not wait. Called the peer to peer line to reschedule and peer to peer has to be done by 6/24.  Scheduled peer to peer for [phone] tomorrow 6/23 with Dr Margaret Wilson

## 2020-06-23 NOTE — TELEPHONE ENCOUNTER
I got a call from P to P review, for cervical spine MRI,   it was approved with   No: [id number]

## 2020-06-26 ENCOUNTER — HOSPITAL ENCOUNTER (OUTPATIENT)
Dept: MRI IMAGING | Age: 25
Discharge: HOME | End: 2020-06-26
Attending: Other
Payer: MEDICAID

## 2020-06-26 DIAGNOSIS — G89.29 CHRONIC NECK PAIN: ICD-10-CM

## 2020-06-26 DIAGNOSIS — M54.2 CHRONIC NECK PAIN: ICD-10-CM

## 2020-06-26 PROCEDURE — 72141 MRI NECK SPINE W/O DYE: CPT | Performed by: OTHER

## 2020-06-29 ENCOUNTER — OFFICE VISIT (OUTPATIENT)
Dept: NEUROLOGY | Facility: CLINIC | Age: 25
End: 2020-06-29
Payer: MEDICAID

## 2020-06-29 ENCOUNTER — TELEPHONE (OUTPATIENT)
Dept: NEUROLOGY | Facility: CLINIC | Age: 25
End: 2020-06-29

## 2020-06-29 VITALS
RESPIRATION RATE: 16 BRPM | SYSTOLIC BLOOD PRESSURE: 124 MMHG | BODY MASS INDEX: 26 KG/M2 | DIASTOLIC BLOOD PRESSURE: 70 MMHG | WEIGHT: 156 LBS | HEART RATE: 68 BPM

## 2020-06-29 DIAGNOSIS — M54.81 BILATERAL OCCIPITAL NEURALGIA: ICD-10-CM

## 2020-06-29 DIAGNOSIS — G44.209 TENSION HEADACHE: ICD-10-CM

## 2020-06-29 DIAGNOSIS — M54.2 CERVICALGIA: Primary | Chronic | ICD-10-CM

## 2020-06-29 DIAGNOSIS — G43.009 MIGRAINE WITHOUT AURA AND WITHOUT STATUS MIGRAINOSUS, NOT INTRACTABLE: ICD-10-CM

## 2020-06-29 DIAGNOSIS — M54.81 CERVICO-OCCIPITAL NEURALGIA: ICD-10-CM

## 2020-06-29 DIAGNOSIS — M54.2 CHRONIC NECK PAIN: ICD-10-CM

## 2020-06-29 DIAGNOSIS — G89.29 CHRONIC NECK PAIN: ICD-10-CM

## 2020-06-29 PROCEDURE — 99214 OFFICE O/P EST MOD 30 MIN: CPT | Performed by: OTHER

## 2020-06-29 RX ORDER — NORTRIPTYLINE HYDROCHLORIDE 50 MG/1
100 CAPSULE ORAL NIGHTLY
Qty: 60 CAPSULE | Refills: 5 | Status: SHIPPED | OUTPATIENT
Start: 2020-06-29 | End: 2020-08-10

## 2020-06-29 NOTE — TELEPHONE ENCOUNTER
Sent pt Purple Harry message with results. ----- Message from Nathaniel Young MD sent at 6/29/2020  8:33 AM CDT -----  C spine MRI is normal. She can do neck PT for muscle spasm.

## 2020-06-29 NOTE — PROGRESS NOTES
[facility]   Neurology- f/u    Jaja John Patient Status:  No patient class for patient encounter    1995 MRN [id number]   Location [address] [place] [address], [address] [place] [address] Drive, [address] [place] [address] REE Middleton Michie, DO week, each one lasted 1-2 days, no light sensitivity, some nausea feeling, she takes tylenol now,  She had cervical spine MRI done, came back to review film,     Pertinent imaging and laboratory work-up:   XR cervical 1/2019  CONCLUSION:  Essentially quinten BY MOUTH QID , Disp: 360 tablet, Rfl: 3  •  ondansetron 8 MG Oral Tablet Dispersible, Take 1 tablet (8 mg total) by mouth every 8 (eight) hours as needed for Nausea., Disp: 8 tablet, Rfl: 0  •  Omeprazole 40 MG Oral Capsule Delayed Release, TAKE 1 CAPSULE Sensory exam revealed normal light touch perception. Vibratory perception and proprioception were intact at the toes. Pinprick and temperature were normal. Romberg sign was absent. Complex motor skills revealed normal coordination.  Finger-nose-finger in Nortriptyline HCl 50 MG Oral Cap 60 capsule 5     Sig: Take 2 capsules (100 mg total) by mouth nightly. We discussed in depth regarding the diagnosis, prognosis, treatment. The patient was given ample opportunity to ask questions.  All questions an

## 2020-07-03 DIAGNOSIS — N94.6 DYSMENORRHEA: ICD-10-CM

## 2020-07-03 DIAGNOSIS — R12 HEARTBURN: ICD-10-CM

## 2020-07-03 DIAGNOSIS — Z30.41 ENCOUNTER FOR SURVEILLANCE OF CONTRACEPTIVE PILLS: ICD-10-CM

## 2020-07-03 DIAGNOSIS — K29.00 OTHER ACUTE GASTRITIS WITHOUT HEMORRHAGE: ICD-10-CM

## 2020-07-06 RX ORDER — LEVONORGESTREL AND ETHINYL ESTRADIOL 0.1-0.02MG
KIT ORAL
Qty: 84 TABLET | Refills: 0 | Status: SHIPPED | OUTPATIENT
Start: 2020-07-06 | End: 2020-08-11

## 2020-07-06 RX ORDER — OMEPRAZOLE 40 MG/1
CAPSULE, DELAYED RELEASE ORAL
Qty: 90 CAPSULE | Refills: 0 | Status: SHIPPED | OUTPATIENT
Start: 2020-07-06 | End: 2020-07-17

## 2020-07-06 NOTE — TELEPHONE ENCOUNTER
Pt requesting refill of   Requested Prescriptions     Pending Prescriptions Disp Refills   • Omeprazole 40 MG Oral Capsule Delayed Release 90 capsule 0     Sig: TAKE 1 CAPSULE BY MOUTH ONCE DAILY 45 MINUTES BEFORE BREAKFAST     Signed Prescriptions Disp Re

## 2020-07-09 ENCOUNTER — OFFICE VISIT (OUTPATIENT)
Dept: PHYSICAL THERAPY | Facility: HOSPITAL | Age: 25
End: 2020-07-09
Attending: Other
Payer: MEDICAID

## 2020-07-09 DIAGNOSIS — M54.81 CERVICO-OCCIPITAL NEURALGIA: ICD-10-CM

## 2020-07-09 PROCEDURE — 97110 THERAPEUTIC EXERCISES: CPT

## 2020-07-09 PROCEDURE — 97162 PT EVAL MOD COMPLEX 30 MIN: CPT

## 2020-07-09 NOTE — PROGRESS NOTES
SPINE EVALUATION:   Referring Physician: Dr. Cornelia Kahn  Diagnosis:Chronic neck pain, cervicogenic occipital neuralgia  Date of Service: 7/9/2020     PATIENT Betty Arredondo is a 31year old female who presents to therapy today with complaints of ne pain)  Flexion: WFL w/ primary motion at CT junction  Extension: WFL  Sidebending: approx 75% full  Rotation: B WFL B  Accessory motion:Dec ant/sup glide c 3-6 B   Palpation: inc tone L upper trap/scalenes    Strength:Cerv isometric strength 5/5 on MMT  UE participate in planning and for this course of care. Thank you for your referral. Please co-sign or sign and return this letter via fax as soon as possible to [phone].  If you have any questions, please contact me at Dept: [phone]    Sincerely,

## 2020-07-10 ENCOUNTER — OFFICE VISIT (OUTPATIENT)
Dept: FAMILY MEDICINE CLINIC | Facility: CLINIC | Age: 25
End: 2020-07-10
Payer: MEDICAID

## 2020-07-10 VITALS
TEMPERATURE: 98 F | HEART RATE: 67 BPM | OXYGEN SATURATION: 97 % | HEIGHT: 64.5 IN | SYSTOLIC BLOOD PRESSURE: 118 MMHG | WEIGHT: 157.19 LBS | DIASTOLIC BLOOD PRESSURE: 70 MMHG | RESPIRATION RATE: 18 BRPM | BODY MASS INDEX: 26.51 KG/M2

## 2020-07-10 DIAGNOSIS — I88.9 ADENITIS: Primary | ICD-10-CM

## 2020-07-10 PROCEDURE — 99213 OFFICE O/P EST LOW 20 MIN: CPT | Performed by: FAMILY MEDICINE

## 2020-07-10 NOTE — PROGRESS NOTES
CHIEF COMPLAINT: Patient presents with:  Test Results: MRI results; concerned about lymph nodes        HPI:     Denise Davenport is a 31year old female presents for follow-up solitary swollen lymph node on MRI 6/26/2020 ordered by neurologist due to occip 84 tablet 0   • Omeprazole 40 MG Oral Capsule Delayed Release TAKE 1 CAPSULE BY MOUTH ONCE DAILY 45 MINUTES BEFORE BREAKFAST 90 capsule 0   • Nortriptyline HCl 50 MG Oral Cap Take 2 capsules (100 mg total) by mouth nightly.  60 capsule 5   • tiZANidine HCl supple. No adenopathy to shotty lymph nodes to centimeter postauricular about C3-C4 on the left and 2 cm on the right. Heart: RRR without S3 or S4 murmur. Clear S1S2  Lungs: clear to auscultation bilaterally. No rales, rhonchi or wheezes.  Good inspirator 7/1/2020  DATE OF SERVICE: 07.01.2020 US ABDOMEN RUQ (CPT=76705) CLINICAL INDICATION:  Epigastric pain. COMPARISON:  None available. TECHNIQUE: Grayscale imaging of the right upper quadrant was performed.  FINDINGS: PANCREAS: The visualized portions of the disc/facet abnormality, spinal stenosis, or foraminal narrowing. C4-C5:  No significant disc/facet abnormality, spinal stenosis, or foraminal narrowing. C5-C6:  No significant disc/facet abnormality, spinal stenosis, or foraminal narrowing.  C6-C7:  No sign Problem List:     Bilateral occipital neuralgia     Dysmenorrhea     Cervicalgia     Encounter for surveillance of contraceptive pills     Heartburn     Mucous in stools     Gastritis     Cervico-occipital neuralgia     Low serum HDL     Injury of left ank

## 2020-07-13 ENCOUNTER — TELEPHONE (OUTPATIENT)
Dept: PHYSICAL THERAPY | Facility: HOSPITAL | Age: 25
End: 2020-07-13

## 2020-07-16 ENCOUNTER — OFFICE VISIT (OUTPATIENT)
Dept: OTOLARYNGOLOGY | Facility: CLINIC | Age: 25
End: 2020-07-16
Payer: MEDICAID

## 2020-07-16 ENCOUNTER — APPOINTMENT (OUTPATIENT)
Dept: PHYSICAL THERAPY | Facility: HOSPITAL | Age: 25
End: 2020-07-16
Attending: Other
Payer: MEDICAID

## 2020-07-16 VITALS
HEIGHT: 64 IN | WEIGHT: 157 LBS | HEART RATE: 91 BPM | BODY MASS INDEX: 26.8 KG/M2 | DIASTOLIC BLOOD PRESSURE: 82 MMHG | SYSTOLIC BLOOD PRESSURE: 120 MMHG

## 2020-07-16 DIAGNOSIS — R59.0 CERVICAL LYMPHADENOPATHY: Primary | ICD-10-CM

## 2020-07-16 PROCEDURE — 3079F DIAST BP 80-89 MM HG: CPT | Performed by: OTOLARYNGOLOGY

## 2020-07-16 PROCEDURE — 3008F BODY MASS INDEX DOCD: CPT | Performed by: OTOLARYNGOLOGY

## 2020-07-16 PROCEDURE — 99243 OFF/OP CNSLTJ NEW/EST LOW 30: CPT | Performed by: OTOLARYNGOLOGY

## 2020-07-16 PROCEDURE — 3074F SYST BP LT 130 MM HG: CPT | Performed by: OTOLARYNGOLOGY

## 2020-07-16 NOTE — PROGRESS NOTES
Debbie Gene is a 31year old female. Patient presents with:  Lymph Node: pt had recent MRI of spine showing There is a prominent right jugular digastric lymph node measuring 11 x 9 mm.       HISTORY OF PRESENT ILLNESS  She presents with a history of n neg for HP   • ESOPHAGOGASTRODUODENOSCOPY, COLONOSCOPY, POSSIBLE BIOPSY, POSSIBLE POLYPECTOMY 85274, 17077 N/A 10/9/2018    Performed by Karli Rahman MD at [address] [place] [address]  8/2011    wisdom teeth   • OTHER SURGICA Inspection - Normal.        Lymph Detail Normal Submental. Submandibular. Anterior cervical. Posterior cervical. Supraclavicular.         Nose/Mouth/Throat Normal External nose - Normal. Lips/teeth/gums - Normal. Tonsils - Normal. Oropharynx - Normal.   Nos low risk for any type of malignancy at this time therefore I did recommend that we simply repeat an ultrasound of the neck in about 6 months to see if any further lymphadenopathy is present. She agrees with this plan. Odessa Barkley.  Cherylene Scotts, MD    7/16

## 2020-07-17 RX ORDER — ONDANSETRON 8 MG/1
TABLET, ORALLY DISINTEGRATING ORAL
Qty: 8 TABLET | Refills: 0 | Status: SHIPPED | OUTPATIENT
Start: 2020-07-17 | End: 2020-07-31

## 2020-07-17 NOTE — TELEPHONE ENCOUNTER
Pt requesting refill of   Requested Prescriptions     Pending Prescriptions Disp Refills   • ONDANSETRON 8 MG Oral Tablet Dispersible [Pharmacy Med Name: Ondansetron 8 MG Oral Tablet Disintegrating] 8 tablet 0     Sig: DISSOLVE 1 TABLET IN MOUTH EVERY 8 HO

## 2020-07-20 ENCOUNTER — OFFICE VISIT (OUTPATIENT)
Dept: PHYSICAL THERAPY | Facility: HOSPITAL | Age: 25
End: 2020-07-20
Attending: Other
Payer: MEDICAID

## 2020-07-20 PROCEDURE — 97110 THERAPEUTIC EXERCISES: CPT

## 2020-07-20 PROCEDURE — 97140 MANUAL THERAPY 1/> REGIONS: CPT

## 2020-07-20 NOTE — PROGRESS NOTES
Dx:Chronic neck pain, cervicogenic occipital neuralgia          Insurance (Authorized # of Visits):  10          Authorizing Physician: Dr. Shanti Keys  Next MD visit: none scheduled  Fall Risk: standard         Precautions: n/a             Subjective: did home e

## 2020-07-27 ENCOUNTER — OFFICE VISIT (OUTPATIENT)
Dept: PHYSICAL THERAPY | Facility: HOSPITAL | Age: 25
End: 2020-07-27
Attending: Other
Payer: MEDICAID

## 2020-07-27 PROCEDURE — 97110 THERAPEUTIC EXERCISES: CPT

## 2020-07-27 PROCEDURE — 97140 MANUAL THERAPY 1/> REGIONS: CPT

## 2020-07-27 NOTE — PROGRESS NOTES
Dx:Chronic neck pain, cervicogenic occipital neuralgia          Insurance (Authorized # of Visits):  10          Authorizing Physician: Dr. Sara Martin  Next MD visit: none scheduled  Fall Risk: standard         Precautions: n/a             Subjective:doing ex at

## 2020-07-29 ENCOUNTER — OFFICE VISIT (OUTPATIENT)
Dept: PHYSICAL THERAPY | Facility: HOSPITAL | Age: 25
End: 2020-07-29
Attending: Other
Payer: MEDICAID

## 2020-07-29 PROCEDURE — 97110 THERAPEUTIC EXERCISES: CPT

## 2020-07-29 NOTE — PROGRESS NOTES
Dx:Chronic neck pain, cervicogenic occipital neuralgia          Insurance (Authorized # of Visits):  10          Authorizing Physician: Dr. Paulette Ahmadi  Next MD visit: none scheduled  Fall Risk: standard         Precautions: n/a             Subjective:doing ex at w/ idalmis hold in flex supine w/ PT assist x 10 x 2  Chin tuck and cerv flex x 10 supine    Thoracic ext sitting w/ manual cerv collar MT: long axis distraction                   HEP: black tband to progress scap retraction in standing.  WHI  Charges: ex 3

## 2020-07-31 RX ORDER — ONDANSETRON 8 MG/1
TABLET, ORALLY DISINTEGRATING ORAL
Qty: 8 TABLET | Refills: 0 | Status: SHIPPED | OUTPATIENT
Start: 2020-07-31 | End: 2020-08-07

## 2020-08-03 ENCOUNTER — OFFICE VISIT (OUTPATIENT)
Dept: PHYSICAL THERAPY | Facility: HOSPITAL | Age: 25
End: 2020-08-03
Attending: Other
Payer: MEDICAID

## 2020-08-03 PROCEDURE — 97110 THERAPEUTIC EXERCISES: CPT

## 2020-08-03 NOTE — PROGRESS NOTES
Dx:Chronic neck pain, cervicogenic occipital neuralgia          Insurance (Authorized # of Visits):  6          Authorizing Physician: Dr. Skip Robertson  Next MD visit: none scheduled  Fall Risk: standard         Precautions: n/a             Subjective:Doing OK- s supine w/ PT assist x 10 x 2  Chin tuck and cerv flex x 10 supine  scap retraction x 10 + grn tband retraction for HEP therex    Chin tuck w/ idalmis hold in flex supine w/ PT assist x 10 x 2  Chin tuck and cerv flex x 10 supine    Thoracic ext sitting w/ man

## 2020-08-04 ENCOUNTER — LAB ENCOUNTER (OUTPATIENT)
Dept: LAB | Facility: HOSPITAL | Age: 25
End: 2020-08-04
Attending: INTERNAL MEDICINE
Payer: MEDICAID

## 2020-08-04 DIAGNOSIS — R10.9 ABDOMINAL PAIN, UNSPECIFIED ABDOMINAL LOCATION: ICD-10-CM

## 2020-08-05 ENCOUNTER — APPOINTMENT (OUTPATIENT)
Dept: PHYSICAL THERAPY | Facility: HOSPITAL | Age: 25
End: 2020-08-05
Attending: Other
Payer: MEDICAID

## 2020-08-05 LAB — SARS-COV-2 RNA RESP QL NAA+PROBE: NOT DETECTED

## 2020-08-05 NOTE — PROGRESS NOTES
Radha Luis  [address] [address]    Dear Ms Titi De La Rosa    Your test for COVID-19 is negative (no evidence of infection). Please feel free to contact me with any further questions or concerns. Sincerely,  Cehng Andrews MD

## 2020-08-06 ENCOUNTER — ANESTHESIA EVENT (OUTPATIENT)
Dept: ENDOSCOPY | Facility: HOSPITAL | Age: 25
End: 2020-08-06
Payer: MEDICAID

## 2020-08-06 ENCOUNTER — HOSPITAL ENCOUNTER (OUTPATIENT)
Facility: HOSPITAL | Age: 25
Setting detail: HOSPITAL OUTPATIENT SURGERY
Discharge: HOME | End: 2020-08-06
Attending: INTERNAL MEDICINE | Admitting: INTERNAL MEDICINE
Payer: MEDICAID

## 2020-08-06 ENCOUNTER — ANESTHESIA (OUTPATIENT)
Dept: ENDOSCOPY | Facility: HOSPITAL | Age: 25
End: 2020-08-06
Payer: MEDICAID

## 2020-08-06 VITALS
HEIGHT: 64.5 IN | WEIGHT: 156 LBS | SYSTOLIC BLOOD PRESSURE: 114 MMHG | HEART RATE: 88 BPM | DIASTOLIC BLOOD PRESSURE: 70 MMHG | BODY MASS INDEX: 26.31 KG/M2 | OXYGEN SATURATION: 100 % | TEMPERATURE: 97 F | RESPIRATION RATE: 18 BRPM

## 2020-08-06 DIAGNOSIS — R11.0 NAUSEA: ICD-10-CM

## 2020-08-06 DIAGNOSIS — R10.9 ABDOMINAL PAIN, UNSPECIFIED ABDOMINAL LOCATION: Primary | ICD-10-CM

## 2020-08-06 LAB
POCT URINE PREGNANCY: NEGATIVE
PROCEDURE CONTROL: PRESENT

## 2020-08-06 PROCEDURE — 81025 URINE PREGNANCY TEST: CPT | Performed by: INTERNAL MEDICINE

## 2020-08-06 PROCEDURE — 88305 TISSUE EXAM BY PATHOLOGIST: CPT | Performed by: INTERNAL MEDICINE

## 2020-08-06 PROCEDURE — 0DB78ZX EXCISION OF STOMACH, PYLORUS, VIA NATURAL OR ARTIFICIAL OPENING ENDOSCOPIC, DIAGNOSTIC: ICD-10-PCS | Performed by: INTERNAL MEDICINE

## 2020-08-06 RX ORDER — NALOXONE HYDROCHLORIDE 0.4 MG/ML
80 INJECTION, SOLUTION INTRAMUSCULAR; INTRAVENOUS; SUBCUTANEOUS AS NEEDED
Status: DISCONTINUED | OUTPATIENT
Start: 2020-08-06 | End: 2020-08-06

## 2020-08-06 RX ORDER — LIDOCAINE HYDROCHLORIDE 10 MG/ML
INJECTION, SOLUTION EPIDURAL; INFILTRATION; INTRACAUDAL; PERINEURAL AS NEEDED
Status: DISCONTINUED | OUTPATIENT
Start: 2020-08-06 | End: 2020-08-06 | Stop reason: SURG

## 2020-08-06 RX ORDER — ONDANSETRON 2 MG/ML
4 INJECTION INTRAMUSCULAR; INTRAVENOUS AS NEEDED
Status: DISCONTINUED | OUTPATIENT
Start: 2020-08-06 | End: 2020-08-06

## 2020-08-06 RX ORDER — SODIUM CHLORIDE, SODIUM LACTATE, POTASSIUM CHLORIDE, CALCIUM CHLORIDE 600; 310; 30; 20 MG/100ML; MG/100ML; MG/100ML; MG/100ML
INJECTION, SOLUTION INTRAVENOUS CONTINUOUS
Status: DISCONTINUED | OUTPATIENT
Start: 2020-08-06 | End: 2020-08-06

## 2020-08-06 RX ADMIN — SODIUM CHLORIDE, SODIUM LACTATE, POTASSIUM CHLORIDE, CALCIUM CHLORIDE: 600; 310; 30; 20 INJECTION, SOLUTION INTRAVENOUS at 14:47:00

## 2020-08-06 RX ADMIN — SODIUM CHLORIDE, SODIUM LACTATE, POTASSIUM CHLORIDE, CALCIUM CHLORIDE: 600; 310; 30; 20 INJECTION, SOLUTION INTRAVENOUS at 15:05:00

## 2020-08-06 RX ADMIN — LIDOCAINE HYDROCHLORIDE 30 MG: 10 INJECTION, SOLUTION EPIDURAL; INFILTRATION; INTRACAUDAL; PERINEURAL at 14:51:00

## 2020-08-06 NOTE — ANESTHESIA POSTPROCEDURE EVALUATION
[address] [facility] Patient Status:  Hospital Outpatient Surgery   Age/Gender 31year old female MRN [id number]   Location [address] [place] [address]. Attending Alexis Yanez MD   Hosp Day # 0 PCP [place] DO       Anesthesia Po

## 2020-08-06 NOTE — H&P
GI PRE-OP H&P    CC: nausea, abdominal pain  HPI:  Evie Espinoza is a 31year old female who is here for EGD for above indications.     Past Medical History:   Diagnosis Date   • Bilateral occipital neuralgia    • Cholelithiasis    • Esophageal reflux 107    GENERAL: well developed, no acute distress, moves about the room without assistance  HEENT: oropharynx is clear, sclerae are anicteric  CHEST: clear to auscultation bilaterally with symmetric excursion on deep inspiration  CARDIO: regular rate and r

## 2020-08-06 NOTE — ANESTHESIA PREPROCEDURE EVALUATION
PRE-OP EVALUATION    Patient Name: Manoj Booth    Pre-op Diagnosis: Abdominal pain, unspecified abdominal location [R10.9]  Nausea [R11.0]    Procedure(s):  ESOPHAGOGASTRODUODENOSCOPY    Surgeon(s) and Role:     Francesco Manley MD - Primary Neuro/Psych                   (+) headaches           Patient Active Problem List:     Bilateral occipital neuralgia     Dysmenorrhea     Cervicalgia     Encounter for surveillance of contraceptive pills     Heartburn     Mucous in stools ASA: 2   Plan: MAC  NPO status verified and patient meets guidelines. Post-procedure pain management plan discussed with surgeon and patient.       Plan/risks discussed with: patient                Present on Admission:  **None**

## 2020-08-06 NOTE — OPERATIVE REPORT
[facility] GI OPERATIVE REPORT   PATIENT NAME: Alix Morin  MRN: [id number]  DATE OF OPERATION: 8/6/2020  PREOPERATIVE DIAGNOSIS:   1. Abdominal pain  2. Nausea   POSTOPERATIVE DIAGNOSES:  1. Gastritis  2.  Bile in stomach  PROCEDURE PERFORMED: Uppe was suctioned. No ulcers or masses were found. Mild patchy erythema was found in the gastric antrum. No ulcers or masses were noted. Biopsies of the antrum and body were obtained with cold forceps in order to assess for H pylori.  Retroflexion revealed a n

## 2020-08-06 NOTE — BRIEF OP NOTE
Pre-Operative Diagnosis: Abdominal pain, unspecified abdominal location [R10.9]  Nausea [R11.0]     Post-Operative Diagnosis: Gastritis, bile in stomach       Procedure Performed:   Procedure(s):  ESOPHAGOGASTRODUODENOSCOPY with biopsy     Surgeon(s) and R

## 2020-08-07 RX ORDER — ONDANSETRON 8 MG/1
TABLET, ORALLY DISINTEGRATING ORAL
Qty: 8 TABLET | Refills: 0 | Status: SHIPPED | OUTPATIENT
Start: 2020-08-07 | End: 2020-08-21

## 2020-08-11 ENCOUNTER — OFFICE VISIT (OUTPATIENT)
Dept: FAMILY MEDICINE CLINIC | Facility: CLINIC | Age: 25
End: 2020-08-11
Payer: MEDICAID

## 2020-08-11 VITALS
RESPIRATION RATE: 18 BRPM | HEART RATE: 102 BPM | OXYGEN SATURATION: 98 % | HEIGHT: 64.5 IN | TEMPERATURE: 98 F | BODY MASS INDEX: 26.61 KG/M2 | DIASTOLIC BLOOD PRESSURE: 72 MMHG | SYSTOLIC BLOOD PRESSURE: 108 MMHG | WEIGHT: 157.81 LBS

## 2020-08-11 DIAGNOSIS — Z00.00 ANNUAL PHYSICAL EXAM: Primary | ICD-10-CM

## 2020-08-11 DIAGNOSIS — Z13.0 SCREENING FOR ENDOCRINE, METABOLIC, AND IMMUNITY DISORDER: ICD-10-CM

## 2020-08-11 DIAGNOSIS — Z13.228 SCREENING FOR ENDOCRINE, METABOLIC, AND IMMUNITY DISORDER: ICD-10-CM

## 2020-08-11 DIAGNOSIS — N94.6 DYSMENORRHEA: ICD-10-CM

## 2020-08-11 DIAGNOSIS — K29.50 OTHER CHRONIC GASTRITIS WITHOUT HEMORRHAGE: ICD-10-CM

## 2020-08-11 DIAGNOSIS — E78.5 DYSLIPIDEMIA (HIGH LDL; LOW HDL): ICD-10-CM

## 2020-08-11 DIAGNOSIS — Z13.29 SCREENING FOR ENDOCRINE, METABOLIC, AND IMMUNITY DISORDER: ICD-10-CM

## 2020-08-11 DIAGNOSIS — Z30.41 ENCOUNTER FOR SURVEILLANCE OF CONTRACEPTIVE PILLS: ICD-10-CM

## 2020-08-11 DIAGNOSIS — Z12.4 SCREENING FOR CERVICAL CANCER: ICD-10-CM

## 2020-08-11 PROBLEM — S99.912A INJURY OF LEFT ANKLE: Status: RESOLVED | Noted: 2019-07-03 | Resolved: 2020-08-11

## 2020-08-11 PROCEDURE — 3074F SYST BP LT 130 MM HG: CPT | Performed by: FAMILY MEDICINE

## 2020-08-11 PROCEDURE — 3078F DIAST BP <80 MM HG: CPT | Performed by: FAMILY MEDICINE

## 2020-08-11 PROCEDURE — 88175 CYTOPATH C/V AUTO FLUID REDO: CPT | Performed by: FAMILY MEDICINE

## 2020-08-11 PROCEDURE — 3008F BODY MASS INDEX DOCD: CPT | Performed by: FAMILY MEDICINE

## 2020-08-11 PROCEDURE — 99395 PREV VISIT EST AGE 18-39: CPT | Performed by: FAMILY MEDICINE

## 2020-08-11 RX ORDER — LEVONORGESTREL AND ETHINYL ESTRADIOL 0.1-0.02MG
1 KIT ORAL DAILY
Qty: 84 TABLET | Refills: 3 | Status: SHIPPED | OUTPATIENT
Start: 2020-08-11 | End: 2020-12-24

## 2020-08-11 NOTE — PATIENT INSTRUCTIONS
Limit carbs <70 g daily, protein 70 g, calories [id number]   Water 64 oz or more daily   Exercise brisk walk 30 mins or more 5 or more days weekly   3 small meals and 2 snacks   Whole food as much as possible->3 veg daily, lean meat, fish, nuts, legumes, fo lifestyle changes can be made and you can be watched more closely to reduce the risk of disease, or to detect it early enough to treat it most effectively. Screening tests are not considered diagnostic, but are used to determine if more testing is needed. at increased risk for infection should talk with their healthcare provider  Ask your healthcare provider    Vision All women in this age group  At least 1 complete exam in your 25s, and 2 in your 30s    Vaccine Who needs it How often   Chickenpox (varicell a one-time dose of Tdap instead of a Td booster after age 25, then Td every 10 years    Counseling Who needs it How often   BRCA gene mutation testing for breast and ovarian cancer susceptibility  Women with increased risk for having gene mutation  When yo stick with it. You may wonder how you can improve the health of your heart. If you’re thinking about exercise, you’re on the right track.  You don’t need to become an athlete, but you do need a certain amount of brisk exercise to help strengthen your hear dancing, or taking an exercise class.   Stop exercising and call your doctor if you:  · Have chest pain or feel dizzy or lightheaded  · Feel burning, tightness, pressure, or heaviness in your chest, neck, shoulders, back, or arms  · Have unusual shortness o your healthcare provider recommends it. Dining out less often and eating fewer processed foods are two great ways to decrease the amount of salt you consume. · Managing calories. A calorie is a unit of energy.  Your body burns calories for fuel, but if you trans fat. And pay close attention to serving size. For instance, if you plan to eat two servings, double all the numbers on the label. Prepare food right  A key part of healthy cooking is cutting down on added fat and salt.  Look on the internet for lower exercise and get plenty of calcium. After menopause  Menopause is when a woman stops having monthly periods. After menopause, the body makes less estrogen (female hormone). This increases bone loss.  At this point, treatment may be needed to reduce the ris cup   [place], sockeye, canned, with bones   239 mg/3 oz. Tofu made with calcium sulfate   204 mg/3 oz. Low-fat milk   297 mg/1 cup   Soybeans, fresh, boiled   131 mg/1/2 cup   Collards   179 mg/1/2 cup   Swiss cheese   272 mg/1 oz.    White beans, valdez do I need this test?  You may need this test if your healthcare provider wants to check your vitamin D levels to find out if you have any risks to bone health.  These might be:  · Low calcium  · Soft bones caused by low vitamin D or problems using it (osteo this test done? The test is done with a blood sample. A needle is used to draw blood from a vein in your arm or hand. Does this test pose any risks? Having a blood test with a needle carries some risks.  These include bleeding, infection, bruising, and at a high risk for osteoporosis. · Certain medicines. Some medicines, such as cortisone, increase bone loss. They also decrease bone growth. Ask your healthcare provider about any side effects of your medicines, and how to prevent them.   · Protein-rich or instructed. · Stop any exercise that causes pain. Date Last Reviewed: 5/1/2018  © 7865-7098 [employer]. [address] [facility], [address] [place] [address]. All rights reserved.  This information is not intended as a substitute for professional medi up. If a designated family member will be picking up prescription, office must be given name of individual in advance and they must present an ID as well. · The name of the person picking up your prescription must be documented in your chart.

## 2020-08-11 NOTE — PROGRESS NOTES
REASON FOR VISIT:    Kelli Cano is a 31year old female who presents for an [address]. No complaints    Single not active currently.  On ocp dysmenorrhea- wants to continue  G0  menses: regular q month 4-5 days- on menses today-de • tiZANidine HCl 4 MG Oral Tab TAKE 1 TABLET BY MOUTH EVERY NIGHT AT BEDTIME 90 tablet 3   • SUMAtriptan Succinate (IMITREX) 50 MG Oral Tab Take 1 tablet (50 mg total) by mouth every 2 (two) hours as needed for Migraine.  12 tablet 5   • gabapentin 600 MG O If you are a male age 38-65 or a female age 47-67, do you take aspirin?: No    Have you had any immunizations at another office such as Influenza, Hepatitis B, Tetanus, or Pneumococcal?: No    Domestic Abuse: No     CAGE:     Cut : No    Annoyed : No    [facility] [place] HIV Screening For all adults age 22-65, older adults at increased risk No results found for: HIV    Syphilis Screening Screen if pregnant or high risk No results found for: RPR    Hepatitis C Screening Screen those at high risk plus screen one time for liam • SUMAtriptan Succinate (IMITREX) 50 MG Oral Tab Take 1 tablet (50 mg total) by mouth every 2 (two) hours as needed for Migraine. 12 tablet 5   • gabapentin 600 MG Oral Tab Take 600 mg by mouth 3 (three) times daily.    360 tablet 3   • Acetaminophen ER (TY HEENT: denies nasal congestion, sinus pain or ST  LUNGS: denies shortness of breath with exertion  CARDIOVASCULAR: denies chest pain on exertion  GI: denies abdominal pain, denies heartburn  : denies dysuria, vaginal discharge or itching, periods regular Mild elevation BMI 26.6 -encourage improved diet with leafy green vegetables- continue 3 servings of vegetables daily or more  -Encourage healthy diet of whole food and avoid processed food and sugary drinks and sodas.   Diet should include lean meats and v Diet counseling perfomed  Exercise counseling perfomed  STI Prevention counseling perfomed    SUGGESTED VACCINATIONS - Influenza, Pneumococcal, Zoster, Tetanus     Immunization History   Administered Date(s) Administered   • DTAP 04/08/1994, 05/02/1994, 07

## 2020-08-12 ENCOUNTER — PATIENT MESSAGE (OUTPATIENT)
Dept: FAMILY MEDICINE CLINIC | Facility: CLINIC | Age: 25
End: 2020-08-12

## 2020-08-12 DIAGNOSIS — B37.2 CANDIDAL INTERTRIGO: Primary | ICD-10-CM

## 2020-08-12 NOTE — TELEPHONE ENCOUNTER
From: Justyna Cavazos  To: Manuel Jean Baptiste DO  Sent: 8/12/2020 1:36 PM CDT  Subject: Other    Dr. Mona Cavanaugh,   Was the cream for my rash sent to my pharmacy?  I was checking the [employer] [facility]nis and the cream wasn’t coming up under the prescriptions as ready o

## 2020-08-12 NOTE — TELEPHONE ENCOUNTER
There was no documentation of redness under her breasts, but will send in Rx for Nystatin-hydrocortisone cream. She can apply a thin layer BID.  Advise to not use for more than 2 weeks maximum at a time, as the hydrocortisone steroid cream will start to [facility]

## 2020-08-12 NOTE — TELEPHONE ENCOUNTER
Please advise on under breast cream for patient   Patient reports discussed in [facility] 8/11/2020

## 2020-08-14 LAB
LAST PAP RESULT: NORMAL
PAP HISTORY (OTHER THAN LAST PAP): NORMAL

## 2020-08-14 NOTE — TELEPHONE ENCOUNTER
Patient had a history of rash that resolved when patient was evaluated. Description matched that of breast intertrigo/yeast infection. Patient should not be using medication prescribed unless rash present.   If not improving in 1 week recommend patient be

## 2020-08-17 ENCOUNTER — OFFICE VISIT (OUTPATIENT)
Dept: PHYSICAL THERAPY | Facility: HOSPITAL | Age: 25
End: 2020-08-17
Attending: Other
Payer: MEDICAID

## 2020-08-17 PROCEDURE — 97110 THERAPEUTIC EXERCISES: CPT

## 2020-08-17 NOTE — PROGRESS NOTES
Discharge Summary  Pt has attended 6, visits in Physical Therapy. Subjective: reports generally feeling better. Some days neck is sore.  Unable to sleep on her stomach but if she stays straight on her back or side, can usually sleep through the night

## 2020-08-21 ENCOUNTER — PATIENT MESSAGE (OUTPATIENT)
Dept: FAMILY MEDICINE CLINIC | Facility: CLINIC | Age: 25
End: 2020-08-21

## 2020-08-21 RX ORDER — ONDANSETRON 8 MG/1
TABLET, ORALLY DISINTEGRATING ORAL
Qty: 8 TABLET | Refills: 0 | Status: SHIPPED | OUTPATIENT
Start: 2020-08-21 | End: 2020-08-28

## 2020-08-21 RX ORDER — ONDANSETRON 8 MG/1
TABLET, ORALLY DISINTEGRATING ORAL
Qty: 8 TABLET | Refills: 0 | OUTPATIENT
Start: 2020-08-21

## 2020-08-21 NOTE — TELEPHONE ENCOUNTER
Pt requesting refill of   Requested Prescriptions     Pending Prescriptions Disp Refills   • ondansetron 8 MG Oral Tablet Dispersible 8 tablet 0     Sig: DISSOLVE 1 TABLET IN MOUTH EVERY 8 HOURS AS NEEDED FOR NAUSEA          No protocol available, routed t

## 2020-08-21 NOTE — TELEPHONE ENCOUNTER
From: Link Carrillo  To: Beto Malave DO  Sent: 8/21/2020 2:16 PM CDT  Subject: Prescription Question    Dr. Eliceo Zapata,   I forgot to add in my other message, I sent in for a refill of the nausea medication and it has to be approved.  I only have 1 l

## 2020-08-24 ENCOUNTER — PATIENT MESSAGE (OUTPATIENT)
Dept: NEUROLOGY | Facility: CLINIC | Age: 25
End: 2020-08-24

## 2020-08-24 DIAGNOSIS — G89.29 CHRONIC NECK PAIN: Primary | ICD-10-CM

## 2020-08-24 DIAGNOSIS — M54.2 CHRONIC NECK PAIN: Primary | ICD-10-CM

## 2020-08-24 NOTE — TELEPHONE ENCOUNTER
From: Flori Iglesias  To: Gio Garcia MD  Sent: 8/24/2020 9:50 AM CDT  Subject: Other    Dr. Gio Garcia, I finished physical therapy a little over a week ago. I have been continuing at home.  However, my neck still feels the same and my pain really hasn't

## 2020-08-26 ENCOUNTER — LAB ENCOUNTER (OUTPATIENT)
Dept: LAB | Age: 25
End: 2020-08-26
Attending: FAMILY MEDICINE
Payer: MEDICAID

## 2020-08-26 DIAGNOSIS — Z00.00 ANNUAL PHYSICAL EXAM: ICD-10-CM

## 2020-08-26 LAB
CHOLEST SMN-MCNC: 186 MG/DL (ref ?–200)
HDLC SERPL-MCNC: 45 MG/DL (ref 40–59)
LDLC SERPL CALC-MCNC: 117 MG/DL (ref ?–100)
NONHDLC SERPL-MCNC: 141 MG/DL (ref ?–130)
PATIENT FASTING Y/N/NP: YES
TRIGL SERPL-MCNC: 118 MG/DL (ref 30–149)
TSI SER-ACNC: 1.15 MIU/ML (ref 0.36–3.74)
VLDLC SERPL CALC-MCNC: 24 MG/DL (ref 0–30)

## 2020-08-26 PROCEDURE — 80061 LIPID PANEL: CPT

## 2020-08-26 PROCEDURE — 36415 COLL VENOUS BLD VENIPUNCTURE: CPT

## 2020-08-26 PROCEDURE — 84443 ASSAY THYROID STIM HORMONE: CPT

## 2020-08-27 NOTE — PROGRESS NOTES
Pt notified via [employer]:  - TSH normal  - Lipids about the same from May 2019. Recommend healthy diet low in saturated fats, high in fiber, and to do regular exercise.  Ok to rpt in 1 year at annual physical.

## 2020-08-28 RX ORDER — ONDANSETRON 8 MG/1
TABLET, ORALLY DISINTEGRATING ORAL
Qty: 8 TABLET | Refills: 3 | Status: SHIPPED | OUTPATIENT
Start: 2020-08-28 | End: 2020-09-30

## 2020-09-02 ENCOUNTER — OFFICE VISIT (OUTPATIENT)
Dept: PHYSICAL THERAPY | Facility: HOSPITAL | Age: 25
End: 2020-09-02
Attending: Other
Payer: MEDICAID

## 2020-09-02 DIAGNOSIS — M54.2 CHRONIC NECK PAIN: ICD-10-CM

## 2020-09-02 DIAGNOSIS — G89.29 CHRONIC NECK PAIN: ICD-10-CM

## 2020-09-02 PROCEDURE — 97110 THERAPEUTIC EXERCISES: CPT

## 2020-09-02 PROCEDURE — 97161 PT EVAL LOW COMPLEX 20 MIN: CPT

## 2020-09-02 NOTE — PROGRESS NOTES
SPINE EVALUATION:   Referring Physician: Dr. Jones Juan Manuel  Diagnosis: chronic neck pain     Date of Service: 9/2/2020     PATIENT SUMMARY   Ade Pagan is a 31year old female who presents to therapy today with complaints of R sided occipital pain with h objective tests and measures show increased resting activity of cervical musculature, scapular weakness. Functional deficits include but are not limited to prolonged sitting, computer/phone use.   Signs and symptoms are consistent with diagnosis of chronic Eval Low Complexity, therex x 1      Total Timed Treatment: 10 min     Total Treatment Time: 40 min     PLAN OF CARE:    Goals: (to be met in 8 visits)   Patient will demonstrate 90 deg active cervical rotation in order to drive safely within 4 weeks.   Erick Holliday

## 2020-09-09 ENCOUNTER — OFFICE VISIT (OUTPATIENT)
Dept: PHYSICAL THERAPY | Facility: HOSPITAL | Age: 25
End: 2020-09-09
Attending: Other
Payer: MEDICAID

## 2020-09-09 PROCEDURE — 97110 THERAPEUTIC EXERCISES: CPT

## 2020-09-09 PROCEDURE — 97140 MANUAL THERAPY 1/> REGIONS: CPT

## 2020-09-09 NOTE — PROGRESS NOTES
Dx: chronic neck pain             Authorized # of Visits:  2         Next MD visit: none scheduled  Fall Risk: standard         Precautions: n/a             Subjective: Patient reports that she had 3 \"rough days\" since previous session.  2 of these involv Charges: Jake 2( 30 min) manual x 1  ( 15 min)   Total Timed Treatment: 45 min     Total Treatment Time: 45 min

## 2020-09-14 ENCOUNTER — OFFICE VISIT (OUTPATIENT)
Dept: PHYSICAL THERAPY | Facility: HOSPITAL | Age: 25
End: 2020-09-14
Attending: Other
Payer: MEDICAID

## 2020-09-14 PROCEDURE — 97140 MANUAL THERAPY 1/> REGIONS: CPT

## 2020-09-14 PROCEDURE — 97110 THERAPEUTIC EXERCISES: CPT

## 2020-09-14 NOTE — PROGRESS NOTES
Dx: chronic neck pain             Authorized # of Visits:  2         Next MD visit: none scheduled  Fall Risk: standard         Precautions: n/a             Subjective: Patient reports that her neck has been feeling better since previous session with no re C1-2 PAIVM, 5 min    Supine cervical paraspinal STM, 5 min          Charges: Jake 2( 30 min) manual x 1  ( 15 min)   Total Timed Treatment: 45 min     Total Treatment Time: 45 min

## 2020-09-15 ENCOUNTER — PATIENT MESSAGE (OUTPATIENT)
Dept: NEUROLOGY | Facility: CLINIC | Age: 25
End: 2020-09-15

## 2020-09-15 DIAGNOSIS — M54.2 CERVICALGIA: ICD-10-CM

## 2020-09-15 DIAGNOSIS — G89.29 CHRONIC NECK PAIN: Primary | ICD-10-CM

## 2020-09-15 DIAGNOSIS — M54.2 CHRONIC NECK PAIN: Primary | ICD-10-CM

## 2020-09-16 NOTE — TELEPHONE ENCOUNTER
Per PT note on 9/14/20:  Plan: continue PT 2x/week for 3 weeks. PT referral placed for 2 additional visits.

## 2020-09-16 NOTE — TELEPHONE ENCOUNTER
From: Justyna Cavazos  To: Ike Goldman MD  Sent: 9/15/2020 6:44 PM CDT  Subject: Other    Dr. Ike Goldman, I saw I was given more physical therapy, thank you for that. However, I got 3 more visits approved but I have 5 scheduled.  Is there a chance to get th

## 2020-09-18 ENCOUNTER — OFFICE VISIT (OUTPATIENT)
Dept: PHYSICAL THERAPY | Facility: HOSPITAL | Age: 25
End: 2020-09-18
Attending: Other
Payer: MEDICAID

## 2020-09-18 PROCEDURE — 97140 MANUAL THERAPY 1/> REGIONS: CPT

## 2020-09-18 PROCEDURE — 97110 THERAPEUTIC EXERCISES: CPT

## 2020-09-18 NOTE — PROGRESS NOTES
Dx: chronic neck pain             Authorized # of Visits:  5 total         Next MD visit: none scheduled  Fall Risk: standard         Precautions: n/a             Subjective: Patient reports that her neck was sore Tuesday and Wednesday.  She tried doing her 10x5 sec Prone shoulder extension w/ cervical extension, 2x10 supine manually resisted upper cervical flexion and extension, 10 each       1/2 FR thoracic extension, [id number] supine serratus punch, 4#, 20 supine manually resisted rotation to end range, 10 e

## 2020-09-21 ENCOUNTER — APPOINTMENT (OUTPATIENT)
Dept: PHYSICAL THERAPY | Facility: HOSPITAL | Age: 25
End: 2020-09-21
Attending: Other
Payer: MEDICAID

## 2020-09-25 ENCOUNTER — OFFICE VISIT (OUTPATIENT)
Dept: PHYSICAL THERAPY | Facility: HOSPITAL | Age: 25
End: 2020-09-25
Attending: Other
Payer: MEDICAID

## 2020-09-25 PROCEDURE — 97110 THERAPEUTIC EXERCISES: CPT

## 2020-09-25 PROCEDURE — 97140 MANUAL THERAPY 1/> REGIONS: CPT

## 2020-09-25 NOTE — PROGRESS NOTES
Progress Summary  Pt has attended 5 visits in Physical Therapy.    Dx: chronic neck pain             Authorized # of Visits:  5 total         Next MD visit: none scheduled  Fall Risk: standard         Precautions: n/a             Subjective: Patient report demonstrate 90 deg active cervical rotation in order to drive safely within 4 weeks. (progressing)  Patient will tolerate sitting at her computer for 2 hours with no increase in pain within 4 weeks. (progressing)  Patient will achieve FLORENCIA in FOTO score wit 49     Plan: Continue skilled Physical Therapy 2 x/week or a total of 3-4 visits over a 90 day period.  Treatment will include: scapular, UE, core strengthening, manual therapy, postural reeducation       Patient/Family/Caregiver was advised of these findin

## 2020-09-28 ENCOUNTER — OFFICE VISIT (OUTPATIENT)
Dept: PHYSICAL THERAPY | Facility: HOSPITAL | Age: 25
End: 2020-09-28
Attending: Other
Payer: MEDICAID

## 2020-09-28 PROCEDURE — 97110 THERAPEUTIC EXERCISES: CPT

## 2020-09-28 NOTE — PROGRESS NOTES
Dx: chronic neck pain             Authorized # of Visits:  5 total         Next MD visit: none scheduled  Fall Risk: standard         Precautions: n/a             Subjective: Patient reports that her neck has not been bothering her over the weekend.  She di mid trap, 3#, 10  4#, 10 Green TB shoulder extension, 2x10 Cat/camel, 10 Cat/camel, 10 standing OH bar press, 9#, 2x10     Bird dog, 10x5 sec Prone shoulder extension w/ cervical extension, 2x10 supine manually resisted upper cervical flexion and extension

## 2020-09-30 ENCOUNTER — PATIENT MESSAGE (OUTPATIENT)
Dept: FAMILY MEDICINE CLINIC | Facility: CLINIC | Age: 25
End: 2020-09-30

## 2020-09-30 RX ORDER — ONDANSETRON 8 MG/1
8 TABLET, ORALLY DISINTEGRATING ORAL EVERY 8 HOURS PRN
Qty: 8 TABLET | Refills: 3 | Status: SHIPPED | OUTPATIENT
Start: 2020-09-30 | End: 2020-11-24

## 2020-09-30 RX ORDER — ONDANSETRON 8 MG/1
TABLET, ORALLY DISINTEGRATING ORAL
Qty: 8 TABLET | Refills: 0 | OUTPATIENT
Start: 2020-09-30

## 2020-09-30 NOTE — TELEPHONE ENCOUNTER
Pt requesting refill of   Requested Prescriptions     Pending Prescriptions Disp Refills   • ONDANSETRON 8 MG Oral Tablet Dispersible [Pharmacy Med Name: Ondansetron 8 MG Oral Tablet Disintegrating] 8 tablet 0     Sig: DISSOLVE 1 TABLET IN MOUTH EVERY 8

## 2020-09-30 NOTE — TELEPHONE ENCOUNTER
From: Rom Funes  To: Knae Bean, DO  Sent: 9/30/2020 11:12 AM CDT  Subject: Prescription Question    Dr. Vira Lesch, I saw that my nausea medication was denied. Is this because my insurance doesn’t want to cover it?  I was having this same proble

## 2020-10-01 ENCOUNTER — OFFICE VISIT (OUTPATIENT)
Dept: NEUROLOGY | Facility: CLINIC | Age: 25
End: 2020-10-01
Payer: MEDICAID

## 2020-10-01 VITALS
RESPIRATION RATE: 16 BRPM | HEART RATE: 92 BPM | SYSTOLIC BLOOD PRESSURE: 108 MMHG | WEIGHT: 157 LBS | DIASTOLIC BLOOD PRESSURE: 86 MMHG | BODY MASS INDEX: 27 KG/M2

## 2020-10-01 DIAGNOSIS — G43.009 MIGRAINE WITHOUT AURA AND WITHOUT STATUS MIGRAINOSUS, NOT INTRACTABLE: ICD-10-CM

## 2020-10-01 DIAGNOSIS — M54.81 BILATERAL OCCIPITAL NEURALGIA: ICD-10-CM

## 2020-10-01 DIAGNOSIS — G44.209 TENSION HEADACHE: Primary | ICD-10-CM

## 2020-10-01 DIAGNOSIS — G89.29 CHRONIC NECK PAIN: ICD-10-CM

## 2020-10-01 DIAGNOSIS — M54.2 CERVICALGIA: Chronic | ICD-10-CM

## 2020-10-01 DIAGNOSIS — M54.2 CHRONIC NECK PAIN: ICD-10-CM

## 2020-10-01 PROCEDURE — 99214 OFFICE O/P EST MOD 30 MIN: CPT | Performed by: OTHER

## 2020-10-01 PROCEDURE — 3074F SYST BP LT 130 MM HG: CPT | Performed by: OTHER

## 2020-10-01 PROCEDURE — 3079F DIAST BP 80-89 MM HG: CPT | Performed by: OTHER

## 2020-10-01 RX ORDER — NORTRIPTYLINE HYDROCHLORIDE 50 MG/1
100 CAPSULE ORAL NIGHTLY
Qty: 60 CAPSULE | Refills: 11 | Status: SHIPPED | OUTPATIENT
Start: 2020-10-01 | End: 2020-11-24

## 2020-10-01 RX ORDER — SUMATRIPTAN 50 MG/1
50 TABLET, FILM COATED ORAL EVERY 2 HOUR PRN
Qty: 12 TABLET | Refills: 5 | Status: SHIPPED | OUTPATIENT
Start: 2020-10-01

## 2020-10-01 RX ORDER — GABAPENTIN 600 MG/1
600 TABLET ORAL 3 TIMES DAILY
Qty: 360 TABLET | Refills: 3 | Status: SHIPPED | OUTPATIENT
Start: 2020-10-01

## 2020-10-01 RX ORDER — TIZANIDINE 4 MG/1
TABLET ORAL
Qty: 90 TABLET | Refills: 3 | Status: SHIPPED | OUTPATIENT
Start: 2020-10-01

## 2020-10-01 RX ORDER — NORTRIPTYLINE HYDROCHLORIDE 75 MG/1
75 CAPSULE ORAL NIGHTLY
COMMUNITY
Start: 2020-09-26 | End: 2020-10-01

## 2020-10-01 NOTE — PROGRESS NOTES
Misha [address]27   Neurology- f/u  10/1/2020    Franklin Young Patient Status:  No patient class for patient encounter    1995 MRN [id number]   Location [address] [place] [address], [address] [place] [address], [address] [place] [address] REE Holden DO ESOPHAGOGASTRODUODENOSCOPY (EGD) N/A 8/6/2020    Performed by Clarence Diez MD at [facility] ENDOSCOPY   • ESOPHAGOGASTRODUODENOSCOPY, COLONOSCOPY, POSSIBLE BIOPSY, POSSIBLE POLYPECTOMY 40035, 73811 N/A 10/9/2018    Performed by Clarence Diez MD at [facility] tablet (650 mg total) by mouth every 8 (eight) hours as needed for Pain. Max dose: 6 tablets or less than 4000mg daily, Disp: 100 tablet, Rfl: 0  •  Ursodiol 500 MG Oral Tab, Take 1 tablet (500 mg total) by mouth 2 (two) times a day.  (Patient not taking: R Romberg sign was absent. Complex motor skills revealed normal coordination. Finger-nose-finger intact. Gait was narrow and stable, was able to walk on heels, toes and tandem without any difficulty. tenderness is present in bilateral occipital and [address] 12 tablet 5     Sig: Take 1 tablet (50 mg total) by mouth every 2 (two) hours as needed for Migraine. • gabapentin 600 MG Oral Tab 360 tablet 3     Sig: Take 1 tablet (600 mg total) by mouth 3 (three) times daily.    • tiZANidine HCl 4 MG Oral Tab 90 tabl

## 2020-10-02 ENCOUNTER — APPOINTMENT (OUTPATIENT)
Dept: PHYSICAL THERAPY | Facility: HOSPITAL | Age: 25
End: 2020-10-02
Attending: Other
Payer: MEDICAID

## 2020-10-02 ENCOUNTER — PATIENT MESSAGE (OUTPATIENT)
Dept: FAMILY MEDICINE CLINIC | Facility: CLINIC | Age: 25
End: 2020-10-02

## 2020-10-02 ENCOUNTER — TELEPHONE (OUTPATIENT)
Dept: PHYSICAL THERAPY | Facility: HOSPITAL | Age: 25
End: 2020-10-02

## 2020-10-04 ENCOUNTER — PATIENT MESSAGE (OUTPATIENT)
Dept: NEUROLOGY | Facility: CLINIC | Age: 25
End: 2020-10-04

## 2020-10-05 ENCOUNTER — TELEMEDICINE (OUTPATIENT)
Dept: FAMILY MEDICINE CLINIC | Facility: CLINIC | Age: 25
End: 2020-10-05

## 2020-10-05 ENCOUNTER — TELEPHONE (OUTPATIENT)
Dept: NEUROLOGY | Facility: CLINIC | Age: 25
End: 2020-10-05

## 2020-10-05 ENCOUNTER — TELEPHONE (OUTPATIENT)
Dept: FAMILY MEDICINE CLINIC | Facility: CLINIC | Age: 25
End: 2020-10-05

## 2020-10-05 DIAGNOSIS — Z02.9 ADMINISTRATIVE ENCOUNTER: Primary | ICD-10-CM

## 2020-10-05 NOTE — TELEPHONE ENCOUNTER
Called patient and advised that there is nothing to appeal at this point in time. Since insurance has authorized 1 visit. She needs to go to the first visit.   IF after the visit additional visits are needed and are denied, then [facility] can appeal.

## 2020-10-05 NOTE — TELEPHONE ENCOUNTER
Have you been in contact with someone who was confirmed or suspected to have Coronavirus/COVID in the past 30 days? NO    Have you had a COVID-19 viral test in the last 14 days? no testing     What new symptoms are you currently experiencing?  arianna Alejo

## 2020-10-05 NOTE — TELEPHONE ENCOUNTER
From: Justyna Cavazos  To: Ike Goldman MD  Sent: 10/4/2020 10:26 AM CDT  Subject: Visit Follow-up Question    Dr. Ike Goldman, As we had talked about at my visit I wanted to continue physical therapy since I have been making progress.  However, my annoying i

## 2020-10-05 NOTE — TELEPHONE ENCOUNTER
Called patient and explained to her that even though Dr. Cornelia Kahn has ordered PT visits for 2-3 times per week for 6-8 weeks, and only 1 visit shows as approved, there is nothing we can appeal right now because that visit is approved.     Instructed patient to

## 2020-10-05 NOTE — TELEPHONE ENCOUNTER
Patient spoke to the nurse earlier today about the pressure in her head and nose,waiting to see if she able to come in.

## 2020-10-05 NOTE — TELEPHONE ENCOUNTER
Given her symptoms, she cannot come to office. Video or phone visit to be offered as alternatives. If she wants to be seen in person, then you may recommend her to go to the [address] [place] [address]. Thanks.

## 2020-10-05 NOTE — TELEPHONE ENCOUNTER
From: Anali Ledesma  To: Carly Coreas DO  Sent: 10/2/2020 7:00 PM CDT  Subject: Other    Dr. Sarah Olvera been experiencing some pain/ pressure in my forehead( right between my eyebrows) and bridge of my nose.  Normally I would think that the pain

## 2020-10-06 ENCOUNTER — TELEPHONE (OUTPATIENT)
Dept: PHYSICAL THERAPY | Age: 25
End: 2020-10-06

## 2020-10-06 ENCOUNTER — TELEMEDICINE (OUTPATIENT)
Dept: FAMILY MEDICINE CLINIC | Facility: CLINIC | Age: 25
End: 2020-10-06
Payer: MEDICAID

## 2020-10-06 DIAGNOSIS — J01.41 ACUTE RECURRENT PANSINUSITIS: Primary | ICD-10-CM

## 2020-10-06 PROCEDURE — 99213 OFFICE O/P EST LOW 20 MIN: CPT | Performed by: FAMILY MEDICINE

## 2020-10-06 RX ORDER — FLUTICASONE PROPIONATE 50 MCG
2 SPRAY, SUSPENSION (ML) NASAL DAILY
Qty: 1 BOTTLE | Refills: 0 | Status: SHIPPED | OUTPATIENT
Start: 2020-10-06 | End: 2020-11-03 | Stop reason: ALTCHOICE

## 2020-10-06 RX ORDER — AMOXICILLIN AND CLAVULANATE POTASSIUM 875; 125 MG/1; MG/1
1 TABLET, FILM COATED ORAL 2 TIMES DAILY
Qty: 20 TABLET | Refills: 0 | Status: SHIPPED | OUTPATIENT
Start: 2020-10-06 | End: 2020-10-16

## 2020-10-06 NOTE — PROGRESS NOTES
Due to COVID-19 ACTION PLAN, the patient's office visit was converted to a phone or video visit.   Time Spent: 8 mins    Subjective     HPI:   Radha Luis is a 31year old female who presents for frontal and ethmoid sinus pain and maxillary sinus pa home and quarantining regularly without sick family members  Suspect early sinusitis  Viral versus bacterial  Symptoms not improving after 5 days  Start antibiotic and fluticasone-no PCN allergy  Supportive care discussed, salt water gargles, rest, Tylenol

## 2020-10-07 ENCOUNTER — APPOINTMENT (OUTPATIENT)
Dept: PHYSICAL THERAPY | Facility: HOSPITAL | Age: 25
End: 2020-10-07
Attending: Other
Payer: MEDICAID

## 2020-10-15 ENCOUNTER — TELEPHONE (OUTPATIENT)
Dept: FAMILY MEDICINE CLINIC | Facility: CLINIC | Age: 25
End: 2020-10-15

## 2020-10-16 ENCOUNTER — LAB ENCOUNTER (OUTPATIENT)
Dept: LAB | Facility: HOSPITAL | Age: 25
End: 2020-10-16
Attending: FAMILY MEDICINE
Payer: MEDICAID

## 2020-10-16 ENCOUNTER — VIRTUAL PHONE E/M (OUTPATIENT)
Dept: FAMILY MEDICINE CLINIC | Facility: CLINIC | Age: 25
End: 2020-10-16
Payer: MEDICAID

## 2020-10-16 ENCOUNTER — APPOINTMENT (OUTPATIENT)
Dept: PHYSICAL THERAPY | Facility: HOSPITAL | Age: 25
End: 2020-10-16
Attending: Other
Payer: MEDICAID

## 2020-10-16 DIAGNOSIS — J01.10 ACUTE FRONTAL SINUSITIS, RECURRENCE NOT SPECIFIED: Primary | ICD-10-CM

## 2020-10-16 DIAGNOSIS — J01.10 ACUTE FRONTAL SINUSITIS, RECURRENCE NOT SPECIFIED: ICD-10-CM

## 2020-10-16 PROCEDURE — 99214 OFFICE O/P EST MOD 30 MIN: CPT | Performed by: FAMILY MEDICINE

## 2020-10-16 RX ORDER — DOXYCYCLINE HYCLATE 100 MG/1
100 CAPSULE ORAL 2 TIMES DAILY
Qty: 14 CAPSULE | Refills: 0 | Status: SHIPPED | OUTPATIENT
Start: 2020-10-16

## 2020-10-16 NOTE — PATIENT INSTRUCTIONS
Coronavirus Disease 2019 (COVID-19): Overview  Coronavirus disease 2019 (COVID-19) is a respiratory illness. It's caused by a new (novel) coronavirus. There are many types of coronavirus. Coronaviruses are a very common cause of colds and bronchitis.  The You can check your symptoms with the CDC’s Coronavirus Self-. What are possible complications from IJUTE-00? In many cases, this virus can cause infection (pneumonia) in both lungs. In some cases, this can cause death.  Certain people are at highe Your healthcare provider will ask about your symptoms. He or she will ask where you live, and about your recent travel, and any contact with sick people.  If your healthcare provider thinks you may have COVID-19, he or she will consider whether to test you The most proven treatments right now are those to help your body while it fights the virus. This is known as supportive care. Supportive care may include:   · Getting rest.  This helps your body fight the illness. · Staying hydrated.   Drinking liquids is People who have had COVID-19 and are fully recovered may be asked by their healthcare team to consider donating plasma. This is called COVID-19 convalescent plasma donation.  Plasma from people fully recovered from COVID-19 may contain antibodies to help fi

## 2020-10-16 NOTE — PROGRESS NOTES
Virtual Telephone Check-In    Darion Sanz verbally consents to a Virtual/Telephone Check-In visit on 10/16/20. Patient has been referred to the [facility] website at [email or web address] to review the yearly Consent to Treat document.     Patient und free for 24 hrs. Advised to take all precautions, including frequent handwashing and wearing a mask. If sx worsen or become severe, advised to go to ED. Pt understands and agrees with plan.     Return for scheduling dept will call you to make Covid test ap

## 2020-10-16 NOTE — TELEPHONE ENCOUNTER
LM with family member at home to call office when she wakes up regarding her appt today      Need to change today's appt to video visit

## 2020-10-26 ENCOUNTER — APPOINTMENT (OUTPATIENT)
Dept: PHYSICAL THERAPY | Facility: HOSPITAL | Age: 25
End: 2020-10-26
Attending: Other
Payer: MEDICAID

## 2020-10-26 ENCOUNTER — TELEPHONE (OUTPATIENT)
Dept: PHYSICAL THERAPY | Age: 25
End: 2020-10-26

## 2020-10-30 ENCOUNTER — APPOINTMENT (OUTPATIENT)
Dept: PHYSICAL THERAPY | Facility: HOSPITAL | Age: 25
End: 2020-10-30
Attending: Other
Payer: MEDICAID

## 2020-11-02 ENCOUNTER — APPOINTMENT (OUTPATIENT)
Dept: PHYSICAL THERAPY | Facility: HOSPITAL | Age: 25
End: 2020-11-02
Attending: Other
Payer: MEDICAID

## 2020-11-03 ENCOUNTER — TELEMEDICINE (OUTPATIENT)
Dept: FAMILY MEDICINE CLINIC | Facility: CLINIC | Age: 25
End: 2020-11-03
Payer: MEDICAID

## 2020-11-03 DIAGNOSIS — J34.89 FRONTAL SINUS PAIN: Primary | ICD-10-CM

## 2020-11-03 PROCEDURE — 99213 OFFICE O/P EST LOW 20 MIN: CPT | Performed by: FAMILY MEDICINE

## 2020-11-03 NOTE — PROGRESS NOTES
Video Visit    This visit is conducted using Telemedicine with live, interactive video and audio. Rosangela Mcclendon  verbally consents to a Video visit.     Patient understands and accepts financial responsibility for any deductible, co-insurance and/o

## 2020-11-04 ENCOUNTER — PATIENT MESSAGE (OUTPATIENT)
Dept: NEUROLOGY | Facility: CLINIC | Age: 25
End: 2020-11-04

## 2020-11-04 ENCOUNTER — APPOINTMENT (OUTPATIENT)
Dept: PHYSICAL THERAPY | Facility: HOSPITAL | Age: 25
End: 2020-11-04
Attending: Other
Payer: MEDICAID

## 2020-11-05 ENCOUNTER — OFFICE VISIT (OUTPATIENT)
Dept: OTOLARYNGOLOGY | Facility: CLINIC | Age: 25
End: 2020-11-05
Payer: MEDICAID

## 2020-11-05 VITALS
SYSTOLIC BLOOD PRESSURE: 115 MMHG | WEIGHT: 157 LBS | BODY MASS INDEX: 26.48 KG/M2 | DIASTOLIC BLOOD PRESSURE: 76 MMHG | HEIGHT: 64.5 IN | TEMPERATURE: 99 F

## 2020-11-05 DIAGNOSIS — R51.9 FOREHEAD PAIN: Primary | ICD-10-CM

## 2020-11-05 DIAGNOSIS — J32.9 SINUSITIS, UNSPECIFIED CHRONICITY, UNSPECIFIED LOCATION: ICD-10-CM

## 2020-11-05 PROCEDURE — 99213 OFFICE O/P EST LOW 20 MIN: CPT | Performed by: OTOLARYNGOLOGY

## 2020-11-05 PROCEDURE — 3074F SYST BP LT 130 MM HG: CPT | Performed by: OTOLARYNGOLOGY

## 2020-11-05 PROCEDURE — 3008F BODY MASS INDEX DOCD: CPT | Performed by: OTOLARYNGOLOGY

## 2020-11-05 PROCEDURE — 3078F DIAST BP <80 MM HG: CPT | Performed by: OTOLARYNGOLOGY

## 2020-11-05 RX ORDER — CYCLOBENZAPRINE HCL 5 MG
5 TABLET ORAL NIGHTLY
Qty: 30 TABLET | Refills: 0 | Status: SHIPPED | OUTPATIENT
Start: 2020-11-05

## 2020-11-05 RX ORDER — MELOXICAM 15 MG/1
15 TABLET ORAL DAILY
Qty: 30 TABLET | Refills: 3 | Status: SHIPPED | OUTPATIENT
Start: 2020-11-05 | End: 2020-11-09

## 2020-11-05 NOTE — PROGRESS NOTES
Darion Sanz is a 31year old female.   Patient presents with:  Sinus Problem: sinus pressure, sinus headache for a month, had 1 course of antibiotic      HISTORY OF PRESENT ILLNESS  She presents with a history of neck discomfort thought to be due to Topics      Concerns:        Caffeine Concern: No        Exercise: Yes          pt at home        Seat Belt: Yes        Special Diet: No        Stress Concern: No        Weight Concern: No      Family History   Problem Relation Age of Onset   • Arthritis M bleeding and easy bruising.            PHYSICAL EXAM    /76   Temp 99.2 °F (37.3 °C) (Tympanic)   Ht 5' 4.5\" (1.638 m)   Wt 157 lb (71.2 kg)   LMP 10/27/2020 (Exact Date)   BMI 26.53 kg/m²        Constitutional Normal Overall appearance - Normal.   P (IMITREX) 50 MG Oral Tab, Take 1 tablet (50 mg total) by mouth every 2 (two) hours as needed for Migraine. , Disp: 12 tablet, Rfl: 5  •  gabapentin 600 MG Oral Tab, Take 1 tablet (600 mg total) by mouth 3 (three) times daily. , Disp: 360 tablet, Rfl: 3  •  t was prepared using [employer] voice recognition dictation software. As a result errors may occur. When identified these errors have been corrected.  While every attempt is made to correct errors during dictation discrepancies may still exist    Eran ARAUJO

## 2020-11-05 NOTE — TELEPHONE ENCOUNTER
May await next year or go to [facility], to chiropractor treatment, self neck and upper back exercise.  Warm pack or Advil  PRN

## 2020-11-05 NOTE — TELEPHONE ENCOUNTER
From: Herrera Bar  To: Taurus Combs MD  Sent: 11/4/2020 4:41 PM CST  Subject: Other    Dr. Taurus Combs, I recently tried to appeal my physical therapy denial and my appeal got denied.  I was advised by the person who helped me appeal Sheila Grace, to co

## 2020-11-09 ENCOUNTER — APPOINTMENT (OUTPATIENT)
Dept: PHYSICAL THERAPY | Facility: HOSPITAL | Age: 25
End: 2020-11-09
Attending: Other
Payer: MEDICAID

## 2020-11-09 ENCOUNTER — PATIENT MESSAGE (OUTPATIENT)
Dept: OTOLARYNGOLOGY | Facility: CLINIC | Age: 25
End: 2020-11-09

## 2020-11-09 NOTE — TELEPHONE ENCOUNTER
Patient notified of Dr. Gely Grant instructions. Called the pharmacy and cancelled the meloxicam prescription.

## 2020-11-09 NOTE — TELEPHONE ENCOUNTER
LOV 11/5/20 for sinusitis. Was prescribed meloxicam and flexeril. Per patient she cannot take NSAIDS right now because she is recovering from a GI condition from too much aspirin. Asking for an alternative. Thank you.

## 2020-11-09 NOTE — TELEPHONE ENCOUNTER
From: Erika Dasilva  To: Cameron Clark. Taty Rouse MD  Sent: 11/9/2020 10:36 AM CST  Subject: Visit Follow-up Question    Dr. Taty Rouse, I wanted to reach out because the medication you prescribed me I cannot take because one of them is an NSAID medication.  Reaso

## 2020-11-11 ENCOUNTER — APPOINTMENT (OUTPATIENT)
Dept: PHYSICAL THERAPY | Facility: HOSPITAL | Age: 25
End: 2020-11-11
Attending: Other
Payer: MEDICAID

## 2020-11-14 ENCOUNTER — HOSPITAL ENCOUNTER (OUTPATIENT)
Dept: CT IMAGING | Facility: HOSPITAL | Age: 25
Discharge: HOME | End: 2020-11-14
Attending: OTOLARYNGOLOGY
Payer: MEDICAID

## 2020-11-14 DIAGNOSIS — J32.9 SINUSITIS, UNSPECIFIED CHRONICITY, UNSPECIFIED LOCATION: ICD-10-CM

## 2020-11-14 DIAGNOSIS — R51.9 FOREHEAD PAIN: ICD-10-CM

## 2020-11-14 PROCEDURE — 70486 CT MAXILLOFACIAL W/O DYE: CPT | Performed by: OTOLARYNGOLOGY

## 2020-11-16 ENCOUNTER — PATIENT MESSAGE (OUTPATIENT)
Dept: FAMILY MEDICINE CLINIC | Facility: CLINIC | Age: 25
End: 2020-11-16

## 2020-11-16 DIAGNOSIS — J34.89 FRONTAL SINUS PAIN: Primary | ICD-10-CM

## 2020-11-17 NOTE — TELEPHONE ENCOUNTER
From: Bethanie Elkins  To: Malachi Stallings DO  Sent: 11/16/2020 4:56 PM CST  Subject: Referral Request    Dr. July Atwood, I saw Dr. Dior Pedro and got a ct scan and he wants me to come back for a follow up visit.  But I need another referral to see him since m

## 2020-11-19 ENCOUNTER — OFFICE VISIT (OUTPATIENT)
Dept: OTOLARYNGOLOGY | Facility: CLINIC | Age: 25
End: 2020-11-19
Payer: MEDICAID

## 2020-11-19 VITALS
DIASTOLIC BLOOD PRESSURE: 76 MMHG | TEMPERATURE: 99 F | SYSTOLIC BLOOD PRESSURE: 113 MMHG | HEIGHT: 64.5 IN | BODY MASS INDEX: 26.48 KG/M2 | WEIGHT: 157 LBS

## 2020-11-19 DIAGNOSIS — R51.9 FOREHEAD PAIN: Primary | ICD-10-CM

## 2020-11-19 PROCEDURE — 3008F BODY MASS INDEX DOCD: CPT | Performed by: OTOLARYNGOLOGY

## 2020-11-19 PROCEDURE — 3074F SYST BP LT 130 MM HG: CPT | Performed by: OTOLARYNGOLOGY

## 2020-11-19 PROCEDURE — 3078F DIAST BP <80 MM HG: CPT | Performed by: OTOLARYNGOLOGY

## 2020-11-19 PROCEDURE — 99213 OFFICE O/P EST LOW 20 MIN: CPT | Performed by: OTOLARYNGOLOGY

## 2020-11-19 NOTE — PROGRESS NOTES
Rosangela Mcclendon is a 31year old female. Patient presents with:   Follow - Up: CT scan of the sinus result done on 11/14/20      HISTORY OF PRESENT ILLNESS  She presents with a history of neck discomfort thought to be due to her cervical spine.  She un suggestive of any type of sinus problem. CT scan images were reviewed with patient demonstrating no abnormalities. No evidence of sinusitis no mucosal thickening but no lesions or masses. Unremarkable CT scan. Deviation of septum to the left noted. OTHER SURGICAL HISTORY  08\13\10    [place] teeth removal   • PATIENT DENIES ANY SURGICAL HISTORY           REVIEW OF SYSTEMS    System Neg/Pos Details   Constitutional Negative Fatigue, fever and weight loss. ENMT Negative Drooling.    Eyes Negative Blurr Normal. Oropharynx - Normal.   Nose/Mouth/Throat Normal Nares - Right: Normal Left: Normal. Septum -Normal  Turbinates - Right: Normal, Left: Normal.       Current Outpatient Medications:   •  sucralfate 1 GM/10ML Oral Suspension, Take 10 mL (1 g total) by forehead pain is most likely not related in any way to sinus issues.   She does have a long history of cervical pain causing radiating pain to the forehead and we discussed possibly that this too may be some type of process related to her underlying cervica

## 2020-11-22 ENCOUNTER — PATIENT MESSAGE (OUTPATIENT)
Dept: FAMILY MEDICINE CLINIC | Facility: CLINIC | Age: 25
End: 2020-11-22

## 2020-11-23 RX ORDER — ONDANSETRON 8 MG/1
8 TABLET, ORALLY DISINTEGRATING ORAL EVERY 8 HOURS PRN
Qty: 8 TABLET | Refills: 3 | Status: CANCELLED | OUTPATIENT
Start: 2020-11-23

## 2020-11-23 NOTE — TELEPHONE ENCOUNTER
LOV 11/3/2020 for frontal sinus pain    Please advise regarding ondansetron. New medication or quantity?       Pt requesting refill of   Requested Prescriptions     Pending Prescriptions Disp Refills   • ondansetron 8 MG Oral Tablet Dispersible 8 tablet 3

## 2020-11-23 NOTE — TELEPHONE ENCOUNTER
From: Delilah Frye  To:  Barbara Gill DO  Sent: 11/22/2020 2:14 PM CST  Subject: Prescription Question    Dr. Hahn Late, I was trying to refill the Rosa  was telling me I am out of refills, even though it says I have 2 refills on their

## 2020-11-24 ENCOUNTER — OFFICE VISIT (OUTPATIENT)
Dept: NEUROLOGY | Facility: CLINIC | Age: 25
End: 2020-11-24
Payer: MEDICAID

## 2020-11-24 VITALS
BODY MASS INDEX: 26 KG/M2 | SYSTOLIC BLOOD PRESSURE: 102 MMHG | RESPIRATION RATE: 18 BRPM | WEIGHT: 156 LBS | HEART RATE: 90 BPM | DIASTOLIC BLOOD PRESSURE: 70 MMHG

## 2020-11-24 DIAGNOSIS — M54.81 BILATERAL OCCIPITAL NEURALGIA: Chronic | ICD-10-CM

## 2020-11-24 DIAGNOSIS — G44.209 TENSION HEADACHE: Primary | ICD-10-CM

## 2020-11-24 DIAGNOSIS — G43.009 MIGRAINE WITHOUT AURA AND WITHOUT STATUS MIGRAINOSUS, NOT INTRACTABLE: ICD-10-CM

## 2020-11-24 PROCEDURE — 99214 OFFICE O/P EST MOD 30 MIN: CPT | Performed by: OTHER

## 2020-11-24 PROCEDURE — 3074F SYST BP LT 130 MM HG: CPT | Performed by: OTHER

## 2020-11-24 PROCEDURE — 3078F DIAST BP <80 MM HG: CPT | Performed by: OTHER

## 2020-11-24 RX ORDER — NORTRIPTYLINE HYDROCHLORIDE 50 MG/1
150 CAPSULE ORAL NIGHTLY
Qty: 90 CAPSULE | Refills: 11 | Status: SHIPPED | OUTPATIENT
Start: 2020-11-24

## 2020-11-24 NOTE — PROGRESS NOTES
Aimovig 70mg/mL pre-filled SureClick autoinjector given subcutanously every 30 days    Aimovig Questionnaire:      Year of initial migraine onset? 2012  Does patient have more than 15 headache days a month?   yes   How many days monthly?  16  Do headaches

## 2020-11-24 NOTE — PROGRESS NOTES
Misha [address]27   Neurology- f/u  2020    Herrera Bar Patient Status:  No patient class for patient encounter    1995 MRN [id number]   Location [address] [place] [address], [address] [place] [address] PCP DESHAUN Cedeño (CST): Kasandra Carrillo MD on 11/14/2020 at 2:24 PM       Past Medical History:  Past Medical History:   Diagnosis Date   • Bilateral occipital neuralgia    • Cholelithiasis    • Esophageal reflux    • Migraines    • PONV (postoperative nausea and vomiting) 2 (two) times daily. , Disp: 14 capsule, Rfl: 0  •  SUMAtriptan Succinate (IMITREX) 50 MG Oral Tab, Take 1 tablet (50 mg total) by mouth every 2 (two) hours as needed for Migraine. , Disp: 12 tablet, Rfl: 5  •  gabapentin 600 MG Oral Tab, Take 1 tablet (600 atrophy. Hearing was grossly intact. Shoulder shrug was normal.   Motor exam revealed normal muscle bulk and tone. No atrophy or fasciculations.  Manual muscle testing revealed MRC grade 5/5 strength throughout including proximal and distal muscles of the a finding of R jugular  lymph node of unclear significance, ask her to check with her primary for follow up.    .  Common migraine, she still feels daily HA, in frontal, will increase Pamelor to 150 mg qhs, keep  Neurontin to 600 mg qid,   Start aimovig month

## 2020-11-24 NOTE — PROGRESS NOTES
WHAT WAS ORDERED  Your doctor has prescribed Aimovig as a preventative medication for your migraines. This medication is an injection that you will give to yourself at home once every 30 days.     HOW TO GET THE MEDICATION  The prescription for this medica

## 2020-11-27 ENCOUNTER — TELEPHONE (OUTPATIENT)
Dept: NEUROLOGY | Facility: CLINIC | Age: 25
End: 2020-11-27

## 2020-11-27 DIAGNOSIS — G43.009 MIGRAINE WITHOUT AURA AND WITHOUT STATUS MIGRAINOSUS, NOT INTRACTABLE: Primary | ICD-10-CM

## 2020-11-30 RX ORDER — TOPIRAMATE 25 MG/1
25 TABLET ORAL 2 TIMES DAILY
Qty: 60 TABLET | Refills: 2 | Status: SHIPPED | OUTPATIENT
Start: 2020-11-30

## 2020-11-30 NOTE — TELEPHONE ENCOUNTER
Received Denial Letter from [facility] 70 mg/ml Denied  Tracking #:[id number]     Denial Reason: You must meet certain criteria for this drug to be covered by your plan.      -You must be using the requested drug to prevent one

## 2020-11-30 NOTE — TELEPHONE ENCOUNTER
Per denial and chart review, it appears that pt will need to try and fail a beta-blocker and/or anticonvulsant as preventative medications first.  Will route to provider to advise.

## 2020-11-30 NOTE — TELEPHONE ENCOUNTER
Patient notified of Dr Scot Banda recommendation of trying Topriramate 25mg bid. Patient informed if she does 3 month trial of Topiramate with no relief, we will do another PA for Aimovig. Patient verbalized understanding.

## 2020-12-04 ENCOUNTER — TELEPHONE (OUTPATIENT)
Dept: NEUROLOGY | Facility: CLINIC | Age: 25
End: 2020-12-04

## 2020-12-04 ENCOUNTER — HOSPITAL ENCOUNTER (OUTPATIENT)
Dept: MRI IMAGING | Age: 25
Discharge: HOME | End: 2020-12-04
Attending: Other
Payer: MEDICAID

## 2020-12-04 DIAGNOSIS — G44.209 TENSION HEADACHE: ICD-10-CM

## 2020-12-04 PROCEDURE — 70553 MRI BRAIN STEM W/O & W/DYE: CPT | Performed by: OTHER

## 2020-12-04 PROCEDURE — A9575 INJ GADOTERATE MEGLUMI 0.1ML: HCPCS | Performed by: OTHER

## 2020-12-23 ENCOUNTER — PATIENT MESSAGE (OUTPATIENT)
Dept: FAMILY MEDICINE CLINIC | Facility: CLINIC | Age: 25
End: 2020-12-23

## 2020-12-23 DIAGNOSIS — Z30.41 ENCOUNTER FOR SURVEILLANCE OF CONTRACEPTIVE PILLS: ICD-10-CM

## 2020-12-23 DIAGNOSIS — N94.6 DYSMENORRHEA: ICD-10-CM

## 2020-12-24 RX ORDER — LEVONORGESTREL AND ETHINYL ESTRADIOL 0.1-0.02MG
1 KIT ORAL DAILY
Qty: 84 TABLET | Refills: 2 | Status: SHIPPED | OUTPATIENT
Start: 2020-12-24

## 2020-12-24 NOTE — TELEPHONE ENCOUNTER
From: Delilah Frye  To: Barbara Gill DO  Sent: 12/23/2020 10:41 PM CST  Subject: Prescription Question    Dr. Hahn Late, Just wanted to send a message that I need more refills on my birth control.  I put in for a refill but since I have to start my

## 2020-12-24 NOTE — TELEPHONE ENCOUNTER
Pt requesting refill of   Requested Prescriptions     Pending Prescriptions Disp Refills   • Levonorgestrel-Ethinyl Estrad (LESSINA) 0.1-20 MG-MCG Oral Tab 84 tablet 3     Sig: Take 1 tablet by mouth daily.      Passed protocol, refill approved, sent to [facility]

## 2021-01-11 ENCOUNTER — PATIENT MESSAGE (OUTPATIENT)
Dept: NEUROLOGY | Facility: CLINIC | Age: 26
End: 2021-01-11

## 2021-01-11 DIAGNOSIS — G89.29 CHRONIC NECK PAIN: Primary | ICD-10-CM

## 2021-01-11 DIAGNOSIS — M54.2 CHRONIC NECK PAIN: Primary | ICD-10-CM

## 2021-01-11 DIAGNOSIS — M54.2 CERVICALGIA: ICD-10-CM

## 2021-01-12 NOTE — TELEPHONE ENCOUNTER
From: Kong Silver  To: Red Padilla MD  Sent: 1/11/2021 6:47 PM CST  Subject: Other    Dr. Nohemy Tripp, I know last year we were trying to get me more physical therapy and my insurance wasn't approving it and fighting me on it.  We thought it would be better

## 2021-02-03 ENCOUNTER — OFFICE VISIT (OUTPATIENT)
Dept: PHYSICAL THERAPY | Facility: HOSPITAL | Age: 26
End: 2021-02-03
Attending: Other
Payer: MEDICAID

## 2021-02-03 PROCEDURE — 97161 PT EVAL LOW COMPLEX 20 MIN: CPT

## 2021-02-03 PROCEDURE — 97110 THERAPEUTIC EXERCISES: CPT

## 2021-02-03 NOTE — PROGRESS NOTES
SPINE EVALUATION:   Referring Physician: Dr. Arsenio Finch  Diagnosis: chronic neck pain, cervicalgia     Date of Service: 2/3/2021     PATIENT SUMMARY   Kervin Cole is a 31year old female who presents to therapy today with complaints of R sided occipita The results of the objective tests and measures show scapular weakness, postural impairments, limited passive mobility of the upper cervical spine. Functional deficits include but are not limited to lifting, prolonged sitting, reading.   Signs and symptoms modifications, pain science education  and importance of remaining active  Patient was instructed in and issued a HEP for: upper cervical mobility, scapular strength    Charges: PT Eval Low Complexity, therex x 1      Total Timed Treatment: 15 min     Tota

## 2021-02-09 ENCOUNTER — OFFICE VISIT (OUTPATIENT)
Dept: PHYSICAL THERAPY | Facility: HOSPITAL | Age: 26
End: 2021-02-09
Attending: Other
Payer: MEDICAID

## 2021-02-09 PROCEDURE — 97110 THERAPEUTIC EXERCISES: CPT

## 2021-02-09 PROCEDURE — 97140 MANUAL THERAPY 1/> REGIONS: CPT

## 2021-02-09 NOTE — PROGRESS NOTES
Dx: chronic neck pain, cervicalgia             Authorized # of Visits:  6         Next MD visit: none scheduled  Fall Risk: standard         Precautions: n/a             Subjective: Patient reports that she had slight pain while lifting boxes over the week

## 2021-02-11 ENCOUNTER — OFFICE VISIT (OUTPATIENT)
Dept: PHYSICAL THERAPY | Facility: HOSPITAL | Age: 26
End: 2021-02-11
Attending: Other
Payer: MEDICAID

## 2021-02-11 PROCEDURE — 97110 THERAPEUTIC EXERCISES: CPT

## 2021-02-11 PROCEDURE — 97140 MANUAL THERAPY 1/> REGIONS: CPT

## 2021-02-11 NOTE — PROGRESS NOTES
Dx: chronic neck pain, cervicalgia             Authorized # of Visits:  6         Next MD visit: none scheduled  Fall Risk: standard         Precautions: n/a             Subjective: Patient reports that she has not had a HA since previous session.  HEP is ritu

## 2021-02-17 ENCOUNTER — OFFICE VISIT (OUTPATIENT)
Dept: PHYSICAL THERAPY | Facility: HOSPITAL | Age: 26
End: 2021-02-17
Attending: Other
Payer: MEDICAID

## 2021-02-17 PROCEDURE — 97140 MANUAL THERAPY 1/> REGIONS: CPT

## 2021-02-17 PROCEDURE — 97110 THERAPEUTIC EXERCISES: CPT

## 2021-02-17 NOTE — PROGRESS NOTES
Dx: chronic neck pain, cervicalgia             Authorized # of Visits:  6         Next MD visit: none scheduled  Fall Risk: standard         Precautions: n/a             Subjective: Patient reports that she had \"a couple\" of headaches since previous sess 10 min       Prone C2 CPA, gr III+, 5 min supine C1 lateral glide supine C1 lateral glide         Prone cervical paraspinal STM, 10 min                  Charges: Jake 2( 25 min) manual x 1 ( 20 min)   Total Timed Treatment: 45 min     Total Treatment Time:

## 2021-02-22 ENCOUNTER — APPOINTMENT (OUTPATIENT)
Dept: PHYSICAL THERAPY | Facility: HOSPITAL | Age: 26
End: 2021-02-22
Attending: Other
Payer: MEDICAID

## 2021-02-24 ENCOUNTER — APPOINTMENT (OUTPATIENT)
Dept: PHYSICAL THERAPY | Facility: HOSPITAL | Age: 26
End: 2021-02-24
Attending: Other
Payer: MEDICAID

## 2021-03-01 ENCOUNTER — APPOINTMENT (OUTPATIENT)
Dept: PHYSICAL THERAPY | Facility: HOSPITAL | Age: 26
End: 2021-03-01
Attending: Other
Payer: MEDICAID

## 2021-03-05 ENCOUNTER — APPOINTMENT (OUTPATIENT)
Dept: PHYSICAL THERAPY | Facility: HOSPITAL | Age: 26
End: 2021-03-05
Attending: Other
Payer: MEDICAID

## 2021-03-08 ENCOUNTER — APPOINTMENT (OUTPATIENT)
Dept: PHYSICAL THERAPY | Facility: HOSPITAL | Age: 26
End: 2021-03-08
Attending: Other
Payer: MEDICAID

## 2021-03-12 ENCOUNTER — OFFICE VISIT (OUTPATIENT)
Dept: PHYSICAL THERAPY | Facility: HOSPITAL | Age: 26
End: 2021-03-12
Attending: Other
Payer: MEDICAID

## 2021-03-12 PROCEDURE — 97110 THERAPEUTIC EXERCISES: CPT

## 2021-03-12 PROCEDURE — 97140 MANUAL THERAPY 1/> REGIONS: CPT

## 2021-03-12 NOTE — PROGRESS NOTES
Progress Summary  Pt has attended 5 visits in Physical Therapy.    Dx: chronic neck pain, cervicalgia             Authorized # of Visits:  6         Next MD visit: none scheduled  Fall Risk: standard         Precautions: n/a             Subjective: Patient retraction/extension, rotation, 10 cervical flexion, rotation, 2x10 seated OH press, 4#, 2x12  6#, 12 Cable shrugs, 15#, 2x15      DNF lift and rotate, 8 SB supine chin tuck and rotation, 5 Bent over row, 6#,  RDL to row with bar, 9#, 3x5      Supine cervi this letter via fax as soon as possible to [phone]. I certify the need for these services furnished under this plan of treatment and while under my care.     X___________________________________________________ Date____________________    [facility]

## 2021-03-15 ENCOUNTER — APPOINTMENT (OUTPATIENT)
Dept: PHYSICAL THERAPY | Facility: HOSPITAL | Age: 26
End: 2021-03-15
Attending: Other
Payer: MEDICAID

## 2021-03-19 ENCOUNTER — OFFICE VISIT (OUTPATIENT)
Dept: PHYSICAL THERAPY | Facility: HOSPITAL | Age: 26
End: 2021-03-19
Attending: Other
Payer: MEDICAID

## 2021-03-19 PROCEDURE — 97110 THERAPEUTIC EXERCISES: CPT

## 2021-03-19 PROCEDURE — 97140 MANUAL THERAPY 1/> REGIONS: CPT

## 2021-03-19 NOTE — PROGRESS NOTES
Dx: chronic neck pain, cervicalgia             Authorized # of Visits:  6         Next MD visit: none scheduled  Fall Risk: standard         Precautions: n/a             Subjective: Patient repots that she had R sided neck pain from the occiput to the scap flexion, rotation, 10 SB thoracic extension  Upper thoracic extension over SB Yellow TB serratus wall slide, 2x10 seated pull down, 25#, 2x15     Upper cervical snag extension w/ R rotation, 10 Lat pull down seated, 30#, 2x12 supine chin tuck to flexion, 2

## 2021-03-22 ENCOUNTER — TELEPHONE (OUTPATIENT)
Dept: PHYSICAL THERAPY | Facility: HOSPITAL | Age: 26
End: 2021-03-22

## 2021-03-22 ENCOUNTER — APPOINTMENT (OUTPATIENT)
Dept: PHYSICAL THERAPY | Facility: HOSPITAL | Age: 26
End: 2021-03-22
Attending: Other
Payer: MEDICAID

## 2021-03-24 ENCOUNTER — APPOINTMENT (OUTPATIENT)
Dept: PHYSICAL THERAPY | Facility: HOSPITAL | Age: 26
End: 2021-03-24
Attending: Other
Payer: MEDICAID

## 2021-04-05 ENCOUNTER — APPOINTMENT (OUTPATIENT)
Dept: PHYSICAL THERAPY | Facility: HOSPITAL | Age: 26
End: 2021-04-05
Attending: Other
Payer: MEDICAID

## 2021-04-30 ENCOUNTER — APPOINTMENT (OUTPATIENT)
Dept: PHYSICAL THERAPY | Facility: HOSPITAL | Age: 26
End: 2021-04-30
Attending: Other
Payer: MEDICAID

## 2021-05-06 ENCOUNTER — PATIENT MESSAGE (OUTPATIENT)
Dept: NEUROLOGY | Facility: CLINIC | Age: 26
End: 2021-05-06

## 2021-05-06 ENCOUNTER — TELEPHONE (OUTPATIENT)
Dept: FAMILY MEDICINE CLINIC | Facility: CLINIC | Age: 26
End: 2021-05-06

## 2021-05-06 DIAGNOSIS — M54.2 CERVICALGIA: Primary | ICD-10-CM

## 2021-05-06 DIAGNOSIS — M54.2 CHRONIC NECK PAIN: ICD-10-CM

## 2021-05-06 DIAGNOSIS — G89.29 CHRONIC NECK PAIN: ICD-10-CM

## 2021-05-06 DIAGNOSIS — M54.81 CERVICO-OCCIPITAL NEURALGIA: ICD-10-CM

## 2021-05-06 NOTE — TELEPHONE ENCOUNTER
From: Herrera Bar  To: Taurus Combs MD  Sent: 5/6/2021 11:25 AM CDT  Subject: Other    Dr. Scotty Saez, I was supposed to see you for a follow up in February but unfortunately I moved and now live in [place].  I was doing physical therapy before I left and w

## 2021-05-06 NOTE — TELEPHONE ENCOUNTER
Spoke with patient she informed me that she has moved to [place]. New PCP is [address] [facility].

## 2021-05-06 NOTE — TELEPHONE ENCOUNTER
Received signed medical records request/authorization form for Supriya Kidd to disclose patient health information to the Facility identified below:     Facility / Provider Name: [facility] [address] Address: [address] [place] [address]

## 2021-06-10 ENCOUNTER — TELEPHONE (OUTPATIENT)
Dept: PHYSICAL THERAPY | Facility: HOSPITAL | Age: 26
End: 2021-06-10

## 2022-08-15 NOTE — TELEPHONE ENCOUNTER
Please change to video visit. Since not better after ABx, may be viral and may have been Covid all long. I'll assess her at video visit, will likely need covid testing. Admission

## 2025-02-16 NOTE — TELEPHONE ENCOUNTER
Pt requesting a refill of omeprazole, no protocol. Unable to approve.     LOV 8/2/18  LF 7/30/18 #90 with 0 refills    Future Appointments   Date Time Provider Camilo Lloyd   11/16/2018  9:45 AM Holly Chopra MD [address] [place] [address] No

## (undated) DEVICE — ENDOSCOPY PACK UPPER: Brand: MEDLINE INDUSTRIES, INC.

## (undated) DEVICE — FORCEP BIOPSY RJ4 LG CAP W/ND

## (undated) DEVICE — GLOVE SURG SENSICARE SZ 7

## (undated) DEVICE — 1200CC GUARDIAN II: Brand: GUARDIAN

## (undated) DEVICE — Device: Brand: DEFENDO AIR/WATER/SUCTION AND BIOPSY VALVE

## (undated) DEVICE — 3M™ RED DOT™ MONITORING ELECTRODE WITH FOAM TAPE AND STICKY GEL, 50/BAG, 20/CASE, 72/PLT 2570: Brand: RED DOT™

## (undated) DEVICE — FILTERLINE NASAL ADULT O2/CO2

## (undated) NOTE — LETTER
George Summers Do  [address]  [place], [address]       07/16/20        Patient: Debbie Hawkins   YOB: 1995   Date of Visit: 7/16/2020       Dear  Dr. Julio Alvarado, DO,      Thank you for referring Debbie Hawkins to my practice.   Pl

## (undated) NOTE — LETTER
Patient Name: Rox Oh  YOB: 1995          MRN number:  [id number]  Date:  8/17/2020  Referring Physician:  Jonnathan Mulligan    Dear Dr. Elina Fuentes,    Discharge Summary  Pt has attended 6, visits in Physical Therapy.      Subjective: reports ge

## (undated) NOTE — MR AVS SNAPSHOT
[facility] [address] [place] [address] [address] [facility]  [address] [phone]               Thank you for choosing us for your health care visit with Sammy Correa DO.   We are glad to serve you and happy to provide you wi these problems early, when they are easier to treat. Pap tests can also detect some infections of the cervix and vagina. What is a Pap test?  A Pap test is a procedure that helps find changes in the cervix that may lead to cancer.  The cervix is the part o · You may need a different screening schedule if you are at high risk for cervical cancer. Risk factors include having HIV, a weak immune system, or exposure to the medication MAYURI while your mother was pregnant with you.  Talk with your doctor about the bes and not as flat as the cells on the surface of the cervix. When a Pap test sample shows healthy cells of both types, the results are negative. Keep having Pap tests as often as directed.   Abnormal results  A positive Pap test result means some cells in the © 7626-5781 [address], [address]. All rights reserved. This information is not intended as a substitute for professional medical care. Always follow your healthcare professional's instructions.              Al Negative for intraepithelial lesion or malignancy    Final Diagnosis Comment {\rtf1\ansi\trvhylb1483\ftnbj\uc1 {\rtf1\wbulmz60144\ansi\wmsafye7119\ftnbj\uc1\deff0{\fonttbl{\f0 \fswiss MS Sans Serif;}{\f1 \fswiss \fcharset0 MS Sans Serif;}}{\colortbl ;Ignacio Jimenes ;\kwc088\nhced097\kbpq061 ;\red0\green0\blue0 ;}{\stylesheet{\f0\fs20 Normal;}{\cs1 Default Paragraph Font;}}{\*\revtbl{Unknown;}}\wcqpwk32475\ucsrpt37986\baiez1020\unhpw8408\hxubu5916\quisc2944\pbjhsfe307\kohfjte802\nogrowautofit\ikkmsq102\formshade\nofea mtd5994\glfro7725\glusyqq602\wcruqlm757\nogrowautofit\ywusrk325\formshade\nofeaturethrottle1\dntblnsbdb\fet4\aendnotes\aftnnrlc\pgbrdrhead\pgbrdrfoot\sectd\nhenfe72218\stfeww38418\guttersxn0\dciuughp2316\alwcssjq4695\dznynkwt5370\qixaqsmk2767\oqdkprd934\fo

## (undated) NOTE — LETTER
Patient Name: Rahat Huggins  YOB: 1995          MRN number:  [id number]  Date:  7/9/2020  Referring Physician:  Neil Sepulveda         SPINE EVALUATION:    Referring Physician: Dr. Britney Hi  Diagnosis:Chronic neck pain, cervicogenic occipital neura Observation/Posture: amb ind . Dec cerv lordosis  Neuro Screen: neg neurotension tests.  No dermatomal or myotomal pattern to symptoms    Cerv AROM: (* denotes performed with pain)  Flexion: WFL w/ primary motion at CT junction  Extension: WFL  Sidebending: Education or treatment limitation: None  Rehab Potential:good        Patient/Family/Caregiver was advised of these findings, precautions, and treatment options and has agreed to actively participate in planning and for this course of care.     Thank you for

## (undated) NOTE — MR AVS SNAPSHOT
[facility] [address] [place] [address] [address] [facility]  [address] [phone]               Thank you for choosing us for your health care visit with [facility], .   We are glad to serve you and happy to provide you wi gabapentin 600 MG Tabs   Take 1 tablet (600 mg total) by mouth 3 (three) times daily. What changed:  Another medication with the same name was removed. Continue taking this medication, and follow the directions you see here.    Commonly known as:  NEURON your appointment immediately. However, if you are unsure about the requirements for authorization, please wait 5-7 days and then contact your physician's office.  At that time, you will be provided with any authorization numbers or be assured that none are

## (undated) NOTE — MR AVS SNAPSHOT
[facility] [address] [place] [address] [address] [facility]  Severa Lemon [phone]               Thank you for choosing us for your health care visit with Garima Stewart DO.   We are glad to serve you and happy to provide you wi Breast cancer All women in this age group should talk with their healthcare providers about the need for clinical breast exams (CBE)1 Clinical breast exam every 3 years1   Cervical cancer Women ages 24 and older Women between ages 24 and 34 should have a P Haemophilus influenzae Type B (HIB) Women at increased risk for infection – talk with your healthcare provider 1 to 3 doses   Human papillomavirus (HPV) All women in this age group up to age 32 3 doses; the second dose should be given 1 to 2 months after t 1According to the ACS, women ages 21 to 44 years should have a clinical breast exam (CBE) as part of their routine health exam every 3 years, and breast self-exams are an option for women starting in their 20s. However, the  USPSTF does not recommend CBE. Commonly known as:  DELMY ROSE LESSINA           TiZANidine HCl 4 MG Tabs   TAKE 1 TABLET BY MOUTH EVERY NIGHT AT BEDTIME   Commonly known as:  ZANAFLEX                   Today's Orders     URINALYSIS, AUTO, W/O SCOPE    Complete by:  As directed    Ass For medical emergencies, dial 911.            Visit [facility] online at  [email or web address].tn